# Patient Record
Sex: FEMALE | Race: WHITE | Employment: OTHER | URBAN - METROPOLITAN AREA
[De-identification: names, ages, dates, MRNs, and addresses within clinical notes are randomized per-mention and may not be internally consistent; named-entity substitution may affect disease eponyms.]

---

## 2019-02-11 ENCOUNTER — TRANSCRIBE ORDERS (OUTPATIENT)
Dept: ADMINISTRATIVE | Facility: HOSPITAL | Age: 74
End: 2019-02-11

## 2019-02-11 DIAGNOSIS — M79.661 PAIN OF RIGHT LOWER LEG: Primary | ICD-10-CM

## 2019-03-04 ENCOUNTER — CONSULT (OUTPATIENT)
Dept: HEMATOLOGY ONCOLOGY | Facility: MEDICAL CENTER | Age: 74
End: 2019-03-04
Payer: MEDICARE

## 2019-03-04 VITALS
WEIGHT: 196 LBS | DIASTOLIC BLOOD PRESSURE: 76 MMHG | SYSTOLIC BLOOD PRESSURE: 130 MMHG | TEMPERATURE: 97.6 F | HEART RATE: 62 BPM | BODY MASS INDEX: 33.46 KG/M2 | OXYGEN SATURATION: 95 % | RESPIRATION RATE: 18 BRPM | HEIGHT: 64 IN

## 2019-03-04 DIAGNOSIS — I82.4Z1 ACUTE DEEP VEIN THROMBOSIS (DVT) OF DISTAL VEIN OF RIGHT LOWER EXTREMITY (HCC): Primary | ICD-10-CM

## 2019-03-04 PROBLEM — I82.409 ACUTE DVT (DEEP VENOUS THROMBOSIS) (HCC): Status: ACTIVE | Noted: 2019-03-04

## 2019-03-04 PROCEDURE — 99204 OFFICE O/P NEW MOD 45 MIN: CPT | Performed by: INTERNAL MEDICINE

## 2019-03-04 RX ORDER — DIPHENOXYLATE HYDROCHLORIDE AND ATROPINE SULFATE 2.5; .025 MG/1; MG/1
1 TABLET ORAL DAILY
COMMUNITY

## 2019-03-04 RX ORDER — OMEPRAZOLE 40 MG/1
CAPSULE, DELAYED RELEASE ORAL
COMMUNITY
Start: 2012-02-17 | End: 2019-10-18

## 2019-03-04 RX ORDER — APIXABAN 5 MG/1
TABLET, FILM COATED ORAL
COMMUNITY
Start: 2019-02-11 | End: 2019-10-18

## 2019-03-04 RX ORDER — ATENOLOL 25 MG/1
TABLET ORAL
COMMUNITY
Start: 2018-12-26 | End: 2020-11-25 | Stop reason: ALTCHOICE

## 2019-03-04 RX ORDER — LISINOPRIL AND HYDROCHLOROTHIAZIDE 20; 12.5 MG/1; MG/1
TABLET ORAL
COMMUNITY
Start: 2010-11-09

## 2019-03-04 RX ORDER — CHOLECALCIFEROL (VITAMIN D3) 125 MCG
2000 CAPSULE ORAL DAILY
COMMUNITY

## 2019-03-12 ENCOUNTER — OFFICE VISIT (OUTPATIENT)
Dept: HEMATOLOGY ONCOLOGY | Facility: MEDICAL CENTER | Age: 74
End: 2019-03-12
Payer: MEDICARE

## 2019-03-12 VITALS
DIASTOLIC BLOOD PRESSURE: 72 MMHG | RESPIRATION RATE: 18 BRPM | BODY MASS INDEX: 32.61 KG/M2 | WEIGHT: 191 LBS | OXYGEN SATURATION: 96 % | HEART RATE: 57 BPM | TEMPERATURE: 97.4 F | HEIGHT: 64 IN | SYSTOLIC BLOOD PRESSURE: 128 MMHG

## 2019-03-12 DIAGNOSIS — I82.4Y1 ACUTE DEEP VEIN THROMBOSIS (DVT) OF PROXIMAL VEIN OF RIGHT LOWER EXTREMITY (HCC): Primary | ICD-10-CM

## 2019-03-12 DIAGNOSIS — I82.4Z1 ACUTE DEEP VEIN THROMBOSIS (DVT) OF DISTAL VEIN OF RIGHT LOWER EXTREMITY (HCC): Primary | ICD-10-CM

## 2019-03-12 PROCEDURE — 99213 OFFICE O/P EST LOW 20 MIN: CPT | Performed by: INTERNAL MEDICINE

## 2019-03-12 RX ORDER — TRAMADOL HYDROCHLORIDE 50 MG/1
50 TABLET ORAL EVERY 6 HOURS PRN
Qty: 30 TABLET | Refills: 0 | Status: SHIPPED | OUTPATIENT
Start: 2019-03-12 | End: 2019-10-18

## 2019-03-12 NOTE — PROGRESS NOTES
Kerri Turner  [birth year]  [facility] [phone]    DISCUSSION/SUMMARY:    60-year-old female with a right lower extremity (soleal [distal]) DVT)  Just previous to the symptoms, patient had taken a long car ride to [place]  This was likely a provoked incident  Mrs Delmi Schilling was just seen approximately 1+ week ago  Patient has progressive right lower extremity pain  Clinically there is no signs for progression  We discussed options  Patient is to continue the Eliquis for the time being  Patient is to go for a repeat right lower extremity Doppler soon as possible (today or tomorrow at [facility])  If there is no evidence of progressive DVT, patient is to start wearing a thigh-high compression stocking  If there is evidence of progression, other anticoagulation treatments will need to be discussed  We also discussed pain control measurements  Patient states that Tylenol is +/- effective  In the past, patient states that ibuprofen has been effective  We discussed the fact that ibuprofen can affect her platelets so that this along with the Eliquis could increase her risk for bruising and bleeding  Patient can take Aleve sporadically; I have also given the patient a prescription for Ultram   Patient will call if she has progressive pain control issues, right lower extremity swelling, pulmonary issues  Patient is to return in 3 days  Patient knows to call the hematology/oncology office if there are any other questions or concerns  Carefully review your medication list and verify that the list is accurate and up-to-date  Please call the hematology/oncology office if there are medications missing from the list, medications on the list that you are not currently taking or if there is a dosage or instruction that is different from how you're taking that medication      Patient goals and areas of care: Follow-up Doppler, continue with anticoagulation  Barriers to care:  None  Patient is able to self-care   ___________________________________________________________________________________________________    Chief Complaint   Patient presents with    Follow-up     Right lower extremity DVT     History of Present Illness:    42-year-old female recently referred for evaluation of a right lower extremity DVT  Mrs Karsten Schulte was in her usual state of good health when she began to have pain in her right calf  Because the pain persisted/progressed, patient was seen by her PCP  Workup demonstrated a right lower extremity/distal DVT  Patient was started on Eliquis (on 02/11/2019)  Approximately 1 5 weeks before beginning to feel the pain, patient drove to [place]  Mrs Karsten Schulte was seen approximately 1 5 weeks ago  The plan was for patient to continue with the Eliquis and follow-up in 2 and half months with a repeat Doppler  Patient states that over the past few days, she has had worsening right lower extremity pain that has progressed up the back of her knee and into the back of her thigh  Patient states that the pain is so bad sometimes that it makes her nauseous  No obvious cords  No recent trauma  No problems with excessive bruising or bleeding  No other problems with the Eliquis  No respiratory issues  Activities are decreased because of the pain  Review of Systems   Constitutional: Negative  HENT: Negative  Eyes: Negative  Respiratory: Negative  Cardiovascular: Positive for leg swelling  Gastrointestinal: Negative  Endocrine: Negative  Genitourinary: Negative  Musculoskeletal: Negative  +right lower extremity pain   Skin: Negative  Allergic/Immunologic: Negative  Neurological: Negative  Hematological: Negative  Psychiatric/Behavioral: Negative  All other systems reviewed and are negative       Patient Active Problem List   Diagnosis    Acute DVT (deep venous thrombosis) (HCC)     Past Medical History:   Diagnosis Date    DVT (deep venous thrombosis) (HCC)     Hyperlipidemia     Hypertension     Past medical history:  As above, normal childhood illnesses and vaccinations    Ob/gyn:  Mammograms up-to-date and within normal limits, no postmenopausal bleeding    Past surgical history:  None, no blood transfusions    No past surgical history on file  Family History   Problem Relation Age of Onset    Colon cancer Father     Heart disease Maternal Grandmother     Family history:  3 children, 2 in general good health, 3rd child with significant morbid obesity with PE in the past presently on Eliquis no other known familial or genetic diseases    Social History     Socioeconomic History    Marital status: /Civil Union     Spouse name: Not on file    Number of children: Not on file    Years of education: Not on file    Highest education level: Not on file   Occupational History    Not on file   Social Needs    Financial resource strain: Not on file    Food insecurity:     Worry: Not on file     Inability: Not on file    Transportation needs:     Medical: Not on file     Non-medical: Not on file   Tobacco Use    Smoking status: Never Smoker    Smokeless tobacco: Never Used   Substance and Sexual Activity    Alcohol use:  Yes     Alcohol/week: 0 6 oz     Types: 1 Glasses of wine per week     Frequency: Monthly or less     Drinks per session: 1 or 2     Binge frequency: Never    Drug use: Never    Sexual activity: Not on file   Lifestyle    Physical activity:     Days per week: Not on file     Minutes per session: Not on file    Stress: Not on file   Relationships    Social connections:     Talks on phone: Not on file     Gets together: Not on file     Attends Sabianist service: Not on file     Active member of club or organization: Not on file     Attends meetings of clubs or organizations: Not on file     Relationship status: Not on file  Intimate partner violence:     Fear of current or ex partner: Not on file     Emotionally abused: Not on file     Physically abused: Not on file     Forced sexual activity: Not on file   Other Topics Concern    Not on file   Social History Narrative    Not on file    Social history:  No tobacco, alcohol or drug abuse, no known toxic exposure,     Current Outpatient Medications:     atenolol (TENORMIN) 25 mg tablet, , Disp: , Rfl:     Calcium Carbonate-Vit D-Min (CALCIUM 1200 PO), Take by mouth, Disp: , Rfl:     Cholecalciferol (VITAMIN D3) 2000 units TABS, Take 2,000 Units by mouth daily, Disp: , Rfl:     ELIQUIS 5 MG, , Disp: , Rfl:     lisinopril-hydrochlorothiazide (PRINZIDE,ZESTORETIC) 20-12 5 MG per tablet, Take by mouth, Disp: , Rfl:     multivitamin (THERAGRAN) TABS, Take 1 tablet by mouth daily, Disp: , Rfl:     omeprazole (PriLOSEC) 40 MG capsule, Take by mouth, Disp: , Rfl:     Allergies   Allergen Reactions    Penicillins Hives       Vitals:    03/12/19 1259   BP: 128/72   Pulse: 57   Resp: 18   Temp: (!) 97 4 °F (36 3 °C)   SpO2: 96%     Physical Exam   Constitutional: She is oriented to person, place, and time  She appears well-developed and well-nourished  Well-nourished female, no respiratory distress   HENT:   Head: Normocephalic and atraumatic  Right Ear: External ear normal    Left Ear: External ear normal    Mouth/Throat: Oropharynx is clear and moist    Eyes: Pupils are equal, round, and reactive to light  Conjunctivae and EOM are normal    Neck: Normal range of motion  Neck supple  Supple, no JVD   Cardiovascular: Normal rate, regular rhythm, normal heart sounds and intact distal pulses  Pulmonary/Chest: Effort normal and breath sounds normal    Good air entry bilaterally, clear   Abdominal: Soft  Bowel sounds are normal    Soft, nontender, +bowel sounds, no rigidity rebound, cannot palpate liver or spleen   Musculoskeletal: Normal range of motion     Neurological: She is alert and oriented to person, place, and time  She has normal reflexes  Skin: Skin is warm  Warm, moist, good color, no petechiae or ecchymoses   Psychiatric: She has a normal mood and affect  Her behavior is normal  Judgment and thought content normal    Extremities: The right lower extremity does not look any more swollen than the left, possibly slightly less swollen than before  Patient has varicose veins over the shin but no obvious cords, pulses are 1+, area is not particularly tender, good range of motion, pulses are equal bilaterally  Lymphatics:  No adenopathy in the neck, supraclavicular region, axilla and groin bilaterally    Labs    No laboratory test results available for review    Imaging    No [facility]'s imaging studies available for review    02/11/2019 [facility] bilateral lower extremity venous duplex study conclusion stated acute occlusive DVT of the soleal vein from the distal to mid calf on the right  All other right lower extremity vessels were negative for DVT and SVT  No DVT or SVT in the left lower extremity

## 2019-03-15 ENCOUNTER — OFFICE VISIT (OUTPATIENT)
Dept: HEMATOLOGY ONCOLOGY | Facility: MEDICAL CENTER | Age: 74
End: 2019-03-15
Payer: MEDICARE

## 2019-03-15 VITALS
OXYGEN SATURATION: 93 % | RESPIRATION RATE: 18 BRPM | WEIGHT: 192 LBS | BODY MASS INDEX: 32.78 KG/M2 | DIASTOLIC BLOOD PRESSURE: 80 MMHG | TEMPERATURE: 98 F | HEART RATE: 58 BPM | SYSTOLIC BLOOD PRESSURE: 132 MMHG | HEIGHT: 64 IN

## 2019-03-15 DIAGNOSIS — I82.4Z1 ACUTE DEEP VEIN THROMBOSIS (DVT) OF DISTAL VEIN OF RIGHT LOWER EXTREMITY (HCC): Primary | ICD-10-CM

## 2019-03-15 PROCEDURE — 99213 OFFICE O/P EST LOW 20 MIN: CPT | Performed by: INTERNAL MEDICINE

## 2019-03-15 NOTE — PROGRESS NOTES
Paradise Figures  5[birth year]  [facility] [phone]    DISCUSSION/SUMMARY:    14-year-old female with a right lower extremity (soleal [distal]) DVT)  Just previous to the symptoms, patient had taken a long car ride to [place]  This was likely a provoked incident  Mrs Gonsalo Tejada has been on Eliquis but was seen earlier this week because of new right lower extremity pain  A repeat Doppler did not demonstrate any evidence of progression or new thrombosis  In fact, the Doppler demonstrated resolution of the prior DVT  This is obviously good but does not explain the patient's persistent right lower extremity/calf pain  We discussed options  Mrs Gonsalo Tejada is to continue with the anticoagulation monitoring for excessive bruising/bleeding  Patient is to take for 3 months; 3 months will be completed in early May 2019  Patient already has a follow-up appointment here in mid May 2019  I have asked the patient to return to her primary care physician at [address] [place] [address] to try and help clarify the calf pain  Possibly patient will need orthopedics or rehab  Patient will continue to use the extra strength Tylenol for pain; Mrs Gonsalo Tejada was concerned about the Ultram is a potential side effects and does not wish to try this medication  Patient knows to call the hematology/oncology office if there are any other questions or concerns  Carefully review your medication list and verify that the list is accurate and up-to-date  Please call the hematology/oncology office if there are medications missing from the list, medications on the list that you are not currently taking or if there is a dosage or instruction that is different from how you're taking that medication      Patient goals and areas of care:  Continue with anticoagulation, follow-up with PCP  Barriers to care:  None  Patient is able to self-care   ___________________________________________________________________________________________________    Chief Complaint   Patient presents with    Follow-up     Right lower extremity DVT     History of Present Illness:    20-year-old female recently referred for evaluation of a right lower extremity DVT  Mrs Joss Reinoso was in her usual state of good health when she began to have pain in her right calf  Because the pain persisted/progressed, patient was seen by her PCP  Workup demonstrated a right lower extremity/distal DVT  Patient was started on Eliquis (on 02/11/2019)  Approximately 1 5 weeks before beginning to feel the pain, patient drove to [place]  Mrs Joss Reinoso was seen early this week  Patient developed acute right lower extremity pain worse than before the diagnosis of the DVT  There were no obvious cords or clinical findings but patient was sent for repeat Doppler to rule out progression  The Doppler actually demonstrated resolution of the soleal DVT  Patient continues to have the right lower extremity pain  Tylenol is somewhat effective  Patient did not want to try the Ultram   No respiratory issues  No problems with excessive bruising or bleeding  No other pain control issues  No fevers, chills or sweats  Activities are still decreased but about the same as before  Review of Systems   Constitutional: Negative  HENT: Negative  Eyes: Negative  Respiratory: Negative  Cardiovascular: Positive for leg swelling  Gastrointestinal: Negative  Endocrine: Negative  Genitourinary: Negative  Musculoskeletal: Negative  +right lower extremity pain   Skin: Negative  Allergic/Immunologic: Negative  Neurological: Negative  Hematological: Negative  Psychiatric/Behavioral: Negative  All other systems reviewed and are negative       Patient Active Problem List   Diagnosis    Acute DVT (deep venous thrombosis) ([address] )     Past Medical History: Diagnosis Date    DVT (deep venous thrombosis) (HCC)     Hyperlipidemia     Hypertension     Past medical history:  As above, normal childhood illnesses and vaccinations    Ob/gyn:  Mammograms up-to-date and within normal limits, no postmenopausal bleeding    Past surgical history:  None, no blood transfusions    No past surgical history on file  Family History   Problem Relation Age of Onset    Colon cancer Father     Heart disease Maternal Grandmother     Family history:  3 children, 2 in general good health, 3rd child with significant morbid obesity with PE in the past presently on Eliquis no other known familial or genetic diseases    Social History     Socioeconomic History    Marital status: /Civil Union     Spouse name: Not on file    Number of children: Not on file    Years of education: Not on file    Highest education level: Not on file   Occupational History    Not on file   Social Needs    Financial resource strain: Not on file    Food insecurity:     Worry: Not on file     Inability: Not on file    Transportation needs:     Medical: Not on file     Non-medical: Not on file   Tobacco Use    Smoking status: Never Smoker    Smokeless tobacco: Never Used   Substance and Sexual Activity    Alcohol use:  Yes     Alcohol/week: 0 6 oz     Types: 1 Glasses of wine per week     Frequency: Monthly or less     Drinks per session: 1 or 2     Binge frequency: Never    Drug use: Never    Sexual activity: Not on file   Lifestyle    Physical activity:     Days per week: Not on file     Minutes per session: Not on file    Stress: Not on file   Relationships    Social connections:     Talks on phone: Not on file     Gets together: Not on file     Attends Episcopalian service: Not on file     Active member of club or organization: Not on file     Attends meetings of clubs or organizations: Not on file     Relationship status: Not on file    Intimate partner violence:     Fear of current or ex partner: Not on file     Emotionally abused: Not on file     Physically abused: Not on file     Forced sexual activity: Not on file   Other Topics Concern    Not on file   Social History Narrative    Not on file    Social history:  No tobacco, alcohol or drug abuse, no known toxic exposure,     Current Outpatient Medications:     atenolol (TENORMIN) 25 mg tablet, , Disp: , Rfl:     Calcium Carbonate-Vit D-Min (CALCIUM 1200 PO), Take by mouth, Disp: , Rfl:     Cholecalciferol (VITAMIN D3) 2000 units TABS, Take 2,000 Units by mouth daily, Disp: , Rfl:     ELIQUIS 5 MG, , Disp: , Rfl:     lisinopril-hydrochlorothiazide (PRINZIDE,ZESTORETIC) 20-12 5 MG per tablet, Take by mouth, Disp: , Rfl:     multivitamin (THERAGRAN) TABS, Take 1 tablet by mouth daily, Disp: , Rfl:     omeprazole (PriLOSEC) 40 MG capsule, Take by mouth, Disp: , Rfl:     traMADol (ULTRAM) 50 mg tablet, Take 1 tablet (50 mg total) by mouth every 6 (six) hours as needed for moderate pain, Disp: 30 tablet, Rfl: 0    Allergies   Allergen Reactions    Penicillins Hives       Vitals:    03/15/19 1002   BP: 132/80   Pulse: 58   Resp: 18   Temp: 98 °F (36 7 °C)   SpO2: 93%     Physical Exam   Constitutional: She is oriented to person, place, and time  She appears well-developed and well-nourished  Well-nourished female, no respiratory distress   HENT:   Head: Normocephalic and atraumatic  Right Ear: External ear normal    Left Ear: External ear normal    Mouth/Throat: Oropharynx is clear and moist    Eyes: Pupils are equal, round, and reactive to light  Conjunctivae and EOM are normal    Neck: Normal range of motion  Neck supple  Supple, no JVD   Cardiovascular: Normal rate, regular rhythm, normal heart sounds and intact distal pulses  Pulmonary/Chest: Effort normal and breath sounds normal    Good air entry bilaterally, clear   Abdominal: Soft   Bowel sounds are normal    Soft, nontender, +bowel sounds, no rigidity rebound, cannot palpate liver or spleen   Musculoskeletal: Normal range of motion  Neurological: She is alert and oriented to person, place, and time  She has normal reflexes  Skin: Skin is warm  Warm, moist, good color, no petechiae or ecchymoses   Psychiatric: She has a normal mood and affect  Her behavior is normal  Judgment and thought content normal    Extremities: The right lower extremity is minimally swollen, about the same as before, no cords, pulses are 1+, no redness, no signs of infection, calf is minimally tender, no more than before  Lymphatics:  No adenopathy in the neck, supraclavicular region, axilla and groin bilaterally    Labs    No laboratory test results available for review    Imaging    03/13/2019 right lower extremity Doppler from [address] [place] [address] conclusion stated that compared to the study from February 11, 2019, the soleal vein DVT had resolved and there was no evidence of DVT in any other right lower extremity superficial or deep vein    02/11/2019 ([address] [place] [address] Street) bilateral lower extremity venous duplex study conclusion stated acute occlusive DVT of the soleal vein from the distal to mid calf on the right  All other right lower extremity vessels were negative for DVT and SVT  No DVT or SVT in the left lower extremity

## 2019-05-16 ENCOUNTER — OFFICE VISIT (OUTPATIENT)
Dept: HEMATOLOGY ONCOLOGY | Facility: MEDICAL CENTER | Age: 74
End: 2019-05-16
Payer: MEDICARE

## 2019-05-16 VITALS
SYSTOLIC BLOOD PRESSURE: 128 MMHG | TEMPERATURE: 97.5 F | OXYGEN SATURATION: 97 % | WEIGHT: 193 LBS | HEART RATE: 53 BPM | RESPIRATION RATE: 18 BRPM | BODY MASS INDEX: 32.95 KG/M2 | HEIGHT: 64 IN | DIASTOLIC BLOOD PRESSURE: 70 MMHG

## 2019-05-16 DIAGNOSIS — I82.4Z1 ACUTE DEEP VEIN THROMBOSIS (DVT) OF DISTAL VEIN OF RIGHT LOWER EXTREMITY (HCC): Primary | ICD-10-CM

## 2019-05-16 PROCEDURE — 99213 OFFICE O/P EST LOW 20 MIN: CPT | Performed by: INTERNAL MEDICINE

## 2019-05-16 RX ORDER — SIMVASTATIN 10 MG
TABLET ORAL
Refills: 3 | COMMUNITY
Start: 2019-04-13 | End: 2022-06-06 | Stop reason: DRUGHIGH

## 2019-10-18 ENCOUNTER — HOSPITAL ENCOUNTER (EMERGENCY)
Facility: HOSPITAL | Age: 74
Discharge: HOME | End: 2019-10-18
Attending: EMERGENCY MEDICINE | Admitting: EMERGENCY MEDICINE
Payer: MEDICARE

## 2019-10-18 ENCOUNTER — TELEPHONE (OUTPATIENT)
Dept: HEMATOLOGY ONCOLOGY | Facility: MEDICAL CENTER | Age: 74
End: 2019-10-18

## 2019-10-18 ENCOUNTER — APPOINTMENT (EMERGENCY)
Dept: RADIOLOGY | Facility: HOSPITAL | Age: 74
End: 2019-10-18
Attending: EMERGENCY MEDICINE
Payer: MEDICARE

## 2019-10-18 VITALS
WEIGHT: 192.9 LBS | DIASTOLIC BLOOD PRESSURE: 77 MMHG | TEMPERATURE: 97.7 F | OXYGEN SATURATION: 99 % | BODY MASS INDEX: 32.93 KG/M2 | RESPIRATION RATE: 18 BRPM | HEIGHT: 64 IN | SYSTOLIC BLOOD PRESSURE: 156 MMHG | HEART RATE: 59 BPM

## 2019-10-18 DIAGNOSIS — M79.605 LEFT LEG PAIN: Primary | ICD-10-CM

## 2019-10-18 PROCEDURE — 99283 EMERGENCY DEPT VISIT LOW MDM: CPT

## 2019-10-18 PROCEDURE — 93971 EXTREMITY STUDY: CPT

## 2019-10-18 NOTE — ED PROVIDER NOTES
History  Chief Complaint   Patient presents with    Leg Pain     Patient states that she was laying on the bed a few days ago and felt like she had a shooting pain in her left leg  States that they told her to come in due to a hx of a DVT  Patient has a history of a DVT in the right lower extremity which was treated with 3 months of anticoagulation  She has been maintained on aspirin since without problem  Patient states she was laying on the couch a few days ago when she felt a sudden sharp pain in the other (left) lower extremity in the area of the medial thigh  She states the pain was sharp and radiated from the thigh toward the lower leg  Pain comes and goes  It is not associated with exertion  She is not associated with swelling  Patient became concerned and called her PCP who referred her to the ER for a DVT study  Prior to Admission Medications   Prescriptions Last Dose Informant Patient Reported? Taking? Calcium Carbonate-Vit D-Min (CALCIUM 1200 PO) 10/18/2019 at Unknown time Self Yes Yes   Sig: Take by mouth   Cholecalciferol (VITAMIN D3) 2000 units TABS 10/18/2019 at Unknown time Self Yes Yes   Sig: Take 2,000 Units by mouth daily   atenolol (TENORMIN) 25 mg tablet 10/18/2019 at Unknown time Self Yes Yes   lisinopril-hydrochlorothiazide (PRINZIDE,ZESTORETIC) 20-12 5 MG per tablet 10/18/2019 at Unknown time Self Yes Yes   Sig: Take by mouth   multivitamin (THERAGRAN) TABS 10/18/2019 at Unknown time Self Yes Yes   Sig: Take 1 tablet by mouth daily   simvastatin (ZOCOR) 10 mg tablet 10/18/2019 at Unknown time Self Yes Yes      Facility-Administered Medications: None       Past Medical History:   Diagnosis Date    DVT (deep venous thrombosis) (HCC)     Hyperlipidemia     Hypertension        History reviewed  No pertinent surgical history      Family History   Problem Relation Age of Onset    Colon cancer Father     Heart disease Maternal Grandmother      I have reviewed and agree with the history as documented  Social History     Tobacco Use    Smoking status: Never Smoker    Smokeless tobacco: Never Used   Substance Use Topics    Alcohol use: Yes     Alcohol/week: 1 0 standard drinks     Types: 1 Glasses of wine per week     Frequency: Monthly or less     Drinks per session: 1 or 2     Binge frequency: Never    Drug use: Never        Review of Systems   Constitutional: Negative for chills and fever  HENT: Negative for congestion and sore throat  Eyes: Negative for visual disturbance  Respiratory: Negative for chest tightness and shortness of breath  Cardiovascular: Negative for chest pain, palpitations and leg swelling  Gastrointestinal: Negative for abdominal pain and vomiting  Genitourinary: Negative for dysuria  Musculoskeletal: Positive for myalgias  Negative for gait problem and joint swelling  Sharp thigh pain   Skin: Negative for color change and rash  Neurological: Negative for weakness and numbness  Hematological: Does not bruise/bleed easily  Psychiatric/Behavioral: Negative for confusion  All other systems reviewed and are negative  Physical Exam  Physical Exam   Constitutional: She is oriented to person, place, and time  She appears well-developed and well-nourished  HENT:   Head: Normocephalic and atraumatic  Mouth/Throat: Oropharynx is clear and moist    Eyes: Conjunctivae are normal    Neck: Normal range of motion  Neck supple  Cardiovascular: Normal rate, regular rhythm, normal heart sounds and intact distal pulses  Pulmonary/Chest: Effort normal and breath sounds normal    Abdominal: Soft  Bowel sounds are normal  There is no tenderness  Musculoskeletal: Normal range of motion  She exhibits no edema or tenderness  No reproducible tenderness  Negative Homans  Neurological: She is alert and oriented to person, place, and time  Skin: Skin is warm and dry  Capillary refill takes less than 2 seconds     Psychiatric: She has a normal mood and affect  Her behavior is normal    Nursing note and vitals reviewed  Vital Signs  ED Triage Vitals [10/18/19 1144]   Temperature Pulse Respirations Blood Pressure SpO2   97 7 °F (36 5 °C) 59 18 156/77 99 %      Temp src Heart Rate Source Patient Position - Orthostatic VS BP Location FiO2 (%)   -- -- -- -- --      Pain Score       No Pain           Vitals:    10/18/19 1144   BP: 156/77   Pulse: 59         Visual Acuity      ED Medications  Medications - No data to display    Diagnostic Studies  Results Reviewed     None                 VAS lower limb venous duplex study, unilateral/limited    (Results Pending)              Procedures  Procedures       ED Course                               MDM  Number of Diagnoses or Management Options  Left leg pain:   Diagnosis management comments: Patient was directed to the ED by a physician for a study  My index of suspicion is low      Disposition  Final diagnoses:   Left leg pain     Time reflects when diagnosis was documented in both MDM as applicable and the Disposition within this note     Time User Action Codes Description Comment    10/18/2019  1:48 PM Elan Ugalde Add [[id number]] Left leg pain       ED Disposition     ED Disposition Condition Date/Time Comment    Discharge Stable Fri Oct 18, 2019  1:48 PM Dillon Bond discharge to home/self care  Follow-up Information     Follow up With Specialties Details Why Marcial Gonzalez III, MD Internal Medicine Schedule an appointment as soon as possible for a visit  As needed [address]  [phone]            Patient's Medications   Discharge Prescriptions    No medications on file     No discharge procedures on file      ED Provider  Electronically Signed by           Janell Jeans, MD  10/18/19 3138

## 2019-10-18 NOTE — TELEPHONE ENCOUNTER
Patient can report to the ED as imaging would be needed if she is concerned about a DVT  Otherwise, she can contact her PCP  Thanks

## 2019-10-19 PROCEDURE — 93971 EXTREMITY STUDY: CPT | Performed by: SURGERY

## 2019-11-19 NOTE — PROGRESS NOTES
Adan Hussein  [birth year]  [facility] [place] [facility] [place] [facility] [id number]  HEMATOLOGY/ONCOLOGY CONSULTATION REPORT    DISCUSSION/SUMMARY:    15-year-old female with a right lower extremity (soleal [distal]) DVT  Just previous to the symptoms, patient had taken a long car ride to [place]  This was likely a provoked incident  Presently Mrs Azalea Fleischer states feeling well and clinically there are no troubling signs including no significant lower extremity swelling or any pulmonary issues  We discussed options  We discussed what patient needs to monitor for in regard to cords, worsening pain, leg swelling or any acute pulmonary issues  We also discussed what to monitor for in regard to excessive bruising/bleeding  Previously patient had been on aspirin (Dr Ginny Banegas, Cardiology) - this is on hold  The plan is for patient to be treated for 3 months  Mrs Azalea Fleischer is to return in approximately 2 5 months with a repeat right lower extremity Doppler (at [facility]) before  As above, this was likely a provoked incident and does not require a thrombophilia evaluation  I have asked the patient to speak with her son regarding his PE; see if son underwent a thrombophilia evaluation  The below comes from [facility]; 2016; 149 (2):  [phone]            We will wait and see how patient does with anticoagulation and the right lower extremity DVT but it may be prudent for patient to take Eliquis the day before, the day of and the day after long car rides in the future (patient and  drive to [place] routinely)  Patient is to return in approximately 2 5 months  Patient knows to call the hematology/oncology office if there are any other questions or concerns  Carefully review your medication list and verify that the list is accurate and up-to-date   Please call the hematology/oncology office if there are medications missing from the list, medications on the list that you are not currently taking or if there is a dosage or instruction that is different from how you're taking that medication  Patient goals and areas of care:  Continue with anticoagulation  Barriers to care:  None  Patient is able to self-care   ___________________________________________________________________________________________________    Chief Complaint   Patient presents with    Consult     Right lower extremity DVT     History of Present Illness:    80-year-old female referred for evaluation of a right lower extremity DVT  Mrs Ana Maria Del Rosario was in her usual state of good health when she began to have pain in her right calf  Because the pain persisted/progressed, patient was seen by her PCP  Workup demonstrated a right lower extremity/distal DVT  Patient was started on Eliquis (on 02/11/2019)  Approximately 1 5 weeks before beginning to feel the pain, patient drove to [place]  Presently Mrs Ana Maria Del Rosario states feeling okay, close to baseline  Right lower extremity pain is gone  Activities are close to normal   No significant right lower extremity swelling (less/better than before)  No problems with excessive bruising or bleeding  No shortness of breath or dyspnea on exertion, no chest pain or pressure  No sputum, cough or hemoptysis  No headaches, blurred vision or dizziness, no syncopal episodes  Review of Systems   Constitutional: Negative  HENT: Negative  Eyes: Negative  Respiratory: Negative  Cardiovascular: Positive for leg swelling  Gastrointestinal: Negative  Endocrine: Negative  Genitourinary: Negative  Musculoskeletal: Negative  Skin: Negative  Allergic/Immunologic: Negative  Neurological: Negative  Hematological: Negative  Psychiatric/Behavioral: Negative  All other systems reviewed and are negative       Patient Active Problem List   Diagnosis    Acute DVT (deep venous thrombosis) [facility])     Past Medical History:   Diagnosis Date    DVT (deep venous thrombosis) (HCC)     Hyperlipidemia     Hypertension     Past medical history:  As above, normal childhood illnesses and vaccinations    Ob/gyn:  Mammograms up-to-date and within normal limits, no postmenopausal bleeding    Past surgical history:  None, no blood transfusions    History reviewed  No pertinent surgical history  Family History   Problem Relation Age of Onset    Colon cancer Father     Heart disease Maternal Grandmother     Family history:  3 children, 2 in general good health, 3rd child with significant morbid obesity with PE in the past presently on Eliquis no other known familial or genetic diseases    Social History     Socioeconomic History    Marital status: /Civil Union     Spouse name: Not on file    Number of children: Not on file    Years of education: Not on file    Highest education level: Not on file   Occupational History    Not on file   Social Needs    Financial resource strain: Not on file    Food insecurity:     Worry: Not on file     Inability: Not on file    Transportation needs:     Medical: Not on file     Non-medical: Not on file   Tobacco Use    Smoking status: Never Smoker    Smokeless tobacco: Never Used   Substance and Sexual Activity    Alcohol use:  Yes     Alcohol/week: 0 6 oz     Types: 1 Glasses of wine per week     Frequency: Monthly or less     Drinks per session: 1 or 2     Binge frequency: Never    Drug use: Never    Sexual activity: Not on file   Lifestyle    Physical activity:     Days per week: Not on file     Minutes per session: Not on file    Stress: Not on file   Relationships    Social connections:     Talks on phone: Not on file     Gets together: Not on file     Attends Yazidism service: Not on file     Active member of club or organization: Not on file     Attends meetings of clubs or organizations: Not on file     Relationship status: Not on file    Intimate partner violence: Fear of current or ex partner: Not on file     Emotionally abused: Not on file     Physically abused: Not on file     Forced sexual activity: Not on file   Other Topics Concern    Not on file   Social History Narrative    Not on file    Social history:  No tobacco, alcohol or drug abuse, no known toxic exposure,     Current Outpatient Medications:     atenolol (TENORMIN) 25 mg tablet, , Disp: , Rfl:     Calcium Carbonate-Vit D-Min (CALCIUM 1200 PO), Take by mouth, Disp: , Rfl:     Cholecalciferol (VITAMIN D3) 2000 units TABS, Take 2,000 Units by mouth daily, Disp: , Rfl:     ELIQUIS 5 MG, , Disp: , Rfl:     lisinopril-hydrochlorothiazide (PRINZIDE,ZESTORETIC) 20-12 5 MG per tablet, Take by mouth, Disp: , Rfl:     multivitamin (THERAGRAN) TABS, Take 1 tablet by mouth daily, Disp: , Rfl:     omeprazole (PriLOSEC) 40 MG capsule, Take by mouth, Disp: , Rfl:     Allergies   Allergen Reactions    Penicillins Hives       Vitals:    03/04/19 1345   BP: 130/76   Pulse: 62   Resp: 18   Temp: 97 6 °F (36 4 °C)   SpO2: 95%     Physical Exam   Constitutional: She is oriented to person, place, and time  She appears well-developed and well-nourished  Well-nourished female, no respiratory distress   HENT:   Head: Normocephalic and atraumatic  Right Ear: External ear normal    Left Ear: External ear normal    Mouth/Throat: Oropharynx is clear and moist    Eyes: Pupils are equal, round, and reactive to light  Conjunctivae and EOM are normal    Neck: Normal range of motion  Neck supple  Supple, no JVD   Cardiovascular: Normal rate, regular rhythm, normal heart sounds and intact distal pulses  Pulmonary/Chest: Effort normal and breath sounds normal    Good air entry bilaterally, clear   Abdominal: Soft  Bowel sounds are normal    Soft, nontender, +bowel sounds, no rigidity rebound, cannot palpate liver or spleen   Musculoskeletal: Normal range of motion     Neurological: She is alert and oriented to person, place, and time  She has normal reflexes  Skin: Skin is warm  Warm, moist, good color, no petechiae or ecchymoses   Psychiatric: She has a normal mood and affect  Her behavior is normal  Judgment and thought content normal    Extremities:  0-1 +right lower extremity edema, no cords, +varicose veins, pulses are 1+, no left lower extremity edema  Lymphatics:  No adenopathy in the neck, supraclavicular region, axilla and groin bilaterally    Labs    No laboratory test results available for review    Imaging    No [facility] imaging studies available for review    02/11/2019 [facility]) bilateral lower extremity venous duplex study conclusion stated acute occlusive DVT of the soleal vein from the distal to mid calf on the right  All other right lower extremity vessels were negative for DVT and SVT  No DVT or SVT in the left lower extremity  19-Nov-2019 17:21

## 2020-04-09 ENCOUNTER — TELEPHONE (OUTPATIENT)
Dept: HEMATOLOGY ONCOLOGY | Facility: CLINIC | Age: 75
End: 2020-04-09

## 2020-04-30 ENCOUNTER — OFFICE VISIT (OUTPATIENT)
Dept: HEMATOLOGY ONCOLOGY | Facility: MEDICAL CENTER | Age: 75
End: 2020-04-30
Payer: MEDICARE

## 2020-04-30 VITALS
SYSTOLIC BLOOD PRESSURE: 138 MMHG | HEIGHT: 64 IN | RESPIRATION RATE: 16 BRPM | HEART RATE: 62 BPM | BODY MASS INDEX: 31.41 KG/M2 | TEMPERATURE: 96.7 F | OXYGEN SATURATION: 98 % | DIASTOLIC BLOOD PRESSURE: 84 MMHG | WEIGHT: 184 LBS

## 2020-04-30 DIAGNOSIS — I82.4Z1 ACUTE DEEP VEIN THROMBOSIS (DVT) OF DISTAL VEIN OF RIGHT LOWER EXTREMITY (HCC): Primary | ICD-10-CM

## 2020-04-30 PROCEDURE — 99214 OFFICE O/P EST MOD 30 MIN: CPT | Performed by: INTERNAL MEDICINE

## 2020-05-01 ENCOUNTER — APPOINTMENT (OUTPATIENT)
Dept: LAB | Facility: CLINIC | Age: 75
End: 2020-05-01
Payer: MEDICARE

## 2020-05-01 DIAGNOSIS — I82.4Z1 ACUTE DEEP VEIN THROMBOSIS (DVT) OF DISTAL VEIN OF RIGHT LOWER EXTREMITY (HCC): ICD-10-CM

## 2020-05-01 LAB
ALBUMIN SERPL BCP-MCNC: 3.5 G/DL (ref 3.5–5)
ALP SERPL-CCNC: 74 U/L (ref 46–116)
ALT SERPL W P-5'-P-CCNC: 26 U/L (ref 12–78)
ANION GAP SERPL CALCULATED.3IONS-SCNC: 5 MMOL/L (ref 4–13)
AST SERPL W P-5'-P-CCNC: 11 U/L (ref 5–45)
BASOPHILS # BLD AUTO: 0.07 THOUSANDS/ΜL (ref 0–0.1)
BASOPHILS NFR BLD AUTO: 1 % (ref 0–1)
BILIRUB SERPL-MCNC: 0.6 MG/DL (ref 0.2–1)
BUN SERPL-MCNC: 20 MG/DL (ref 5–25)
CALCIUM SERPL-MCNC: 9.3 MG/DL (ref 8.3–10.1)
CHLORIDE SERPL-SCNC: 107 MMOL/L (ref 100–108)
CO2 SERPL-SCNC: 30 MMOL/L (ref 21–32)
CREAT SERPL-MCNC: 0.91 MG/DL (ref 0.6–1.3)
EOSINOPHIL # BLD AUTO: 0.18 THOUSAND/ΜL (ref 0–0.61)
EOSINOPHIL NFR BLD AUTO: 4 % (ref 0–6)
ERYTHROCYTE [DISTWIDTH] IN BLOOD BY AUTOMATED COUNT: 12.6 % (ref 11.6–15.1)
GFR SERPL CREATININE-BSD FRML MDRD: 62 ML/MIN/1.73SQ M
GLUCOSE P FAST SERPL-MCNC: 94 MG/DL (ref 65–99)
HCT VFR BLD AUTO: 37 % (ref 34.8–46.1)
HGB BLD-MCNC: 12 G/DL (ref 11.5–15.4)
IMM GRANULOCYTES # BLD AUTO: 0.01 THOUSAND/UL (ref 0–0.2)
IMM GRANULOCYTES NFR BLD AUTO: 0 % (ref 0–2)
LYMPHOCYTES # BLD AUTO: 2.06 THOUSANDS/ΜL (ref 0.6–4.47)
LYMPHOCYTES NFR BLD AUTO: 42 % (ref 14–44)
MCH RBC QN AUTO: 29 PG (ref 26.8–34.3)
MCHC RBC AUTO-ENTMCNC: 32.4 G/DL (ref 31.4–37.4)
MCV RBC AUTO: 89 FL (ref 82–98)
MONOCYTES # BLD AUTO: 0.44 THOUSAND/ΜL (ref 0.17–1.22)
MONOCYTES NFR BLD AUTO: 9 % (ref 4–12)
NEUTROPHILS # BLD AUTO: 2.11 THOUSANDS/ΜL (ref 1.85–7.62)
NEUTS SEG NFR BLD AUTO: 44 % (ref 43–75)
NRBC BLD AUTO-RTO: 0 /100 WBCS
PLATELET # BLD AUTO: 220 THOUSANDS/UL (ref 149–390)
PMV BLD AUTO: 10.7 FL (ref 8.9–12.7)
POTASSIUM SERPL-SCNC: 4.2 MMOL/L (ref 3.5–5.3)
PROT SERPL-MCNC: 7.1 G/DL (ref 6.4–8.2)
RBC # BLD AUTO: 4.14 MILLION/UL (ref 3.81–5.12)
SODIUM SERPL-SCNC: 142 MMOL/L (ref 136–145)
WBC # BLD AUTO: 4.87 THOUSAND/UL (ref 4.31–10.16)

## 2020-05-01 PROCEDURE — 36415 COLL VENOUS BLD VENIPUNCTURE: CPT

## 2020-05-01 PROCEDURE — 85025 COMPLETE CBC W/AUTO DIFF WBC: CPT

## 2020-05-01 PROCEDURE — 80053 COMPREHEN METABOLIC PANEL: CPT

## 2020-06-12 ENCOUNTER — DOCUMENTATION (OUTPATIENT)
Dept: HEMATOLOGY ONCOLOGY | Facility: MEDICAL CENTER | Age: 75
End: 2020-06-12

## 2020-06-15 ENCOUNTER — OFFICE VISIT (OUTPATIENT)
Dept: HEMATOLOGY ONCOLOGY | Facility: MEDICAL CENTER | Age: 75
End: 2020-06-15
Payer: MEDICARE

## 2020-06-15 VITALS
HEART RATE: 52 BPM | DIASTOLIC BLOOD PRESSURE: 72 MMHG | SYSTOLIC BLOOD PRESSURE: 142 MMHG | BODY MASS INDEX: 31.58 KG/M2 | HEIGHT: 64 IN | TEMPERATURE: 97 F | RESPIRATION RATE: 18 BRPM | WEIGHT: 185 LBS | OXYGEN SATURATION: 99 %

## 2020-06-15 DIAGNOSIS — I82.4Z1 ACUTE DEEP VEIN THROMBOSIS (DVT) OF DISTAL VEIN OF RIGHT LOWER EXTREMITY (HCC): Primary | ICD-10-CM

## 2020-06-15 PROCEDURE — 99213 OFFICE O/P EST LOW 20 MIN: CPT | Performed by: INTERNAL MEDICINE

## 2020-06-15 RX ORDER — ASPIRIN 81 MG/1
81 TABLET ORAL DAILY
COMMUNITY
End: 2022-06-06 | Stop reason: ALTCHOICE

## 2020-09-29 ENCOUNTER — TELEPHONE (OUTPATIENT)
Dept: HEMATOLOGY ONCOLOGY | Facility: CLINIC | Age: 75
End: 2020-09-29

## 2020-09-29 NOTE — TELEPHONE ENCOUNTER
Patient called for refill for Eliquis 5mg PO BID  Patient was previously getting this through PCP  Per patient Dr Jyothi Vidal said he would fill this for her in the future when she needed a refill    Patient is requesting a 90 day supply sent to the [facility] in [place]    Will forward to RN with Dr Jyothi Vidal to review he is ok with filling medication for patient

## 2020-09-30 DIAGNOSIS — I82.4Z1 ACUTE DEEP VEIN THROMBOSIS (DVT) OF DISTAL VEIN OF RIGHT LOWER EXTREMITY (HCC): Primary | ICD-10-CM

## 2020-10-01 ENCOUNTER — DOCUMENTATION (OUTPATIENT)
Dept: HEMATOLOGY ONCOLOGY | Facility: MEDICAL CENTER | Age: 75
End: 2020-10-01

## 2020-10-02 ENCOUNTER — TELEPHONE (OUTPATIENT)
Dept: HEMATOLOGY ONCOLOGY | Facility: CLINIC | Age: 75
End: 2020-10-02

## 2020-11-24 ENCOUNTER — DOCUMENTATION (OUTPATIENT)
Dept: HEMATOLOGY ONCOLOGY | Facility: MEDICAL CENTER | Age: 75
End: 2020-11-24

## 2020-11-25 ENCOUNTER — OFFICE VISIT (OUTPATIENT)
Dept: HEMATOLOGY ONCOLOGY | Facility: MEDICAL CENTER | Age: 75
End: 2020-11-25
Payer: MEDICARE

## 2020-11-25 VITALS
RESPIRATION RATE: 16 BRPM | OXYGEN SATURATION: 100 % | BODY MASS INDEX: 32.1 KG/M2 | DIASTOLIC BLOOD PRESSURE: 88 MMHG | WEIGHT: 188 LBS | HEIGHT: 64 IN | SYSTOLIC BLOOD PRESSURE: 138 MMHG | TEMPERATURE: 97.6 F | HEART RATE: 73 BPM

## 2020-11-25 DIAGNOSIS — I82.4Z1 ACUTE DEEP VEIN THROMBOSIS (DVT) OF DISTAL VEIN OF RIGHT LOWER EXTREMITY (HCC): Primary | ICD-10-CM

## 2020-11-25 PROCEDURE — 99213 OFFICE O/P EST LOW 20 MIN: CPT | Performed by: INTERNAL MEDICINE

## 2020-11-25 RX ORDER — METOPROLOL SUCCINATE 25 MG/1
25 TABLET, EXTENDED RELEASE ORAL DAILY
COMMUNITY
Start: 2020-11-23 | End: 2022-06-06 | Stop reason: ALTCHOICE

## 2021-05-26 ENCOUNTER — OFFICE VISIT (OUTPATIENT)
Dept: HEMATOLOGY ONCOLOGY | Facility: MEDICAL CENTER | Age: 76
End: 2021-05-26
Payer: MEDICARE

## 2021-05-26 VITALS
DIASTOLIC BLOOD PRESSURE: 62 MMHG | HEART RATE: 52 BPM | HEIGHT: 64 IN | RESPIRATION RATE: 18 BRPM | TEMPERATURE: 98.4 F | SYSTOLIC BLOOD PRESSURE: 120 MMHG | WEIGHT: 191 LBS | OXYGEN SATURATION: 93 % | BODY MASS INDEX: 32.61 KG/M2

## 2021-05-26 DIAGNOSIS — I82.4Z1 ACUTE DEEP VEIN THROMBOSIS (DVT) OF DISTAL VEIN OF RIGHT LOWER EXTREMITY (HCC): Primary | ICD-10-CM

## 2021-05-26 PROCEDURE — 99213 OFFICE O/P EST LOW 20 MIN: CPT | Performed by: INTERNAL MEDICINE

## 2021-05-26 NOTE — PROGRESS NOTES
Juan Lundberg  [birth year]  [facility] [place] [id number]    DISCUSSION/SUMMARY:    68year-old female previously with a right lower extremity (soleal [distal]) DVT)  Just previous to her symptoms, patient had taken a long car ride to [place]  This was likely a provoked incident  Mrs Ana Maria Del Rosario completed 3+ months of anticoagulation treatment  Unfortunately, more recently patient developed another left lower extremity DVT  No clear provoking incident at that time  Eliquis was restarted  Mrs Ana Maria Del Rosario feels well and clinically there are no concerning signs  Patient is tolerating the Eliquis well  The plan is to continue  Patient knows to call the office if she is scheduled for any invasive procedure or surgery  Patient will also monitor for any lower extremity swelling, cords, pain or any acute respiratory issues  As discussed previously, because Mrs Ana Maria Del Rosario needs anticoagulation long-term, I do not believe that a thrombophilia evaluation is needed  There is no family history of clots; patient has 1 son who is morbidly obese and had 1 DVT previously  No grandchildren  Of note, patient's  is also on Eliquis - suffered DVT and PE during his COVID infection  Patient is to return in 6 months  Patient knows to call the hematology/oncology office if there are any other questions or concerns  Carefully review your medication list and verify that the list is accurate and up-to-date  Please call the hematology/oncology office if there are medications missing from the list, medications on the list that you are not currently taking or if there is a dosage or instruction that is different from how you're taking that medication      Patient goals and areas of care:  Eliquis  Barriers to care:  None  Patient is able to self-care   ___________________________________________________________________________________________________    Chief Complaint   Patient presents with    Follow-up     Recurrent lower extremity DVTs     History of Present Illness:    15-year-old female previously referred for evaluation of a right lower extremity DVT (long car ride)  Mrs Donell Dobbs completed 3+ months of anticoagulation treatment  More recently patient noticed left lower extremity swelling and tenderness in the calf region  Patient also noticed mild difficulty getting up the steps in her house  Patient was seen by her PCP and a Doppler was ordered  Patient was found to have a new left lower extremity DVT  Mrs Donell Dobbs was restarted on Eliquis  Patient noted that the mild pulmonary symptoms went away within a few days of being on the Eliquis  There was no precipitating event for the 2nd DVT  Patient returns for follow-up  Patient was diagnosed with COVID last fall - no serious complications from the infection  Mrs Donell Dobbs states feeling well  No problems with excessive bruising or bleeding  No shortness of breath or dyspnea on exertion, no chest pain or pressure  No lower extremity swelling, cords or pain  Activities are baseline  Appetite is good, weight is stable  Routine health maintenance and medical care is up-to-date  Patient was previously infected would COVID but has subsequently completed the COVID vaccination  Review of Systems   Constitutional: Negative  HENT: Negative  Eyes: Negative  Respiratory: Negative  Cardiovascular: Negative for leg swelling  Gastrointestinal: Negative  Endocrine: Negative  Genitourinary: Negative  Musculoskeletal: Negative  Skin: Negative  Allergic/Immunologic: Negative  Neurological: Negative  Hematological: Negative  Psychiatric/Behavioral: Negative  All other systems reviewed and are negative       Patient Active Problem List   Diagnosis  Acute DVT (deep venous thrombosis) (HCC)     Past Medical History:   Diagnosis Date    DVT (deep venous thrombosis) (HCC)     Hyperlipidemia     Hypertension     Past medical history:  As above, normal childhood illnesses and vaccinations    Ob/gyn:  Mammograms up-to-date and within normal limits, no postmenopausal bleeding    Past surgical history:  None, no blood transfusions    No past surgical history on file  Family History   Problem Relation Age of Onset    Colon cancer Father     Heart disease Maternal Grandmother     Family history:  3 children, 2 in general good health, 3rd child with significant morbid obesity with PE in the past presently on Eliquis no other known familial or genetic diseases    Social History     Socioeconomic History    Marital status: /Civil Union     Spouse name: Not on file    Number of children: Not on file    Years of education: Not on file    Highest education level: Not on file   Occupational History    Not on file   Social Needs    Financial resource strain: Not on file    Food insecurity     Worry: Not on file     Inability: Not on file   [employer] needs     Medical: Not on file     Non-medical: Not on file   Tobacco Use    Smoking status: Never Smoker    Smokeless tobacco: Never Used   Substance and Sexual Activity    Alcohol use:  Yes     Alcohol/week: 1 0 standard drinks     Types: 1 Glasses of wine per week     Frequency: Monthly or less     Drinks per session: 1 or 2     Binge frequency: Never    Drug use: Never    Sexual activity: Not on file   Lifestyle    Physical activity     Days per week: Not on file     Minutes per session: Not on file    Stress: Not on file   Relationships    Social connections     Talks on phone: Not on file     Gets together: Not on file     Attends Jewish service: Not on file     Active member of club or organization: Not on file     Attends meetings of clubs or organizations: Not on file     Relationship status: Not on file    Intimate partner violence     Fear of current or ex partner: Not on file     Emotionally abused: Not on file     Physically abused: Not on file     Forced sexual activity: Not on file   Other Topics Concern    Not on file   Social History Narrative    Not on file    Social history:  No tobacco, alcohol or drug abuse, no known toxic exposure,     Current Outpatient Medications:     apixaban (Eliquis) 5 mg, Take 1 tablet (5 mg total) by mouth 2 (two) times a day, Disp: 60 tablet, Rfl: 5    Calcium Carbonate-Vit D-Min (CALCIUM 1200 PO), Take by mouth, Disp: , Rfl:     Cholecalciferol (VITAMIN D3) 2000 units TABS, Take 2,000 Units by mouth daily, Disp: , Rfl:     lisinopril-hydrochlorothiazide (PRINZIDE,ZESTORETIC) 20-12 5 MG per tablet, Take by mouth, Disp: , Rfl:     metoprolol succinate (TOPROL-XL) 25 mg 24 hr tablet, Take 25 mg by mouth daily, Disp: , Rfl:     multivitamin (THERAGRAN) TABS, Take 1 tablet by mouth daily, Disp: , Rfl:     simvastatin (ZOCOR) 10 mg tablet, , Disp: , Rfl: 3    aspirin (ASPIRIN LOW DOSE) 81 mg EC tablet, Take 81 mg by mouth daily, Disp: , Rfl:     Allergies   Allergen Reactions    Penicillins Hives       Vitals:    05/26/21 1104   BP: 120/62   Pulse: (!) 52   Resp: 18   Temp: 98 4 °F (36 9 °C)   SpO2: 93%     Physical Exam  Constitutional:       Appearance: She is well-developed  Comments: Well-nourished female, no respiratory distress   HENT:      Head: Normocephalic and atraumatic  Right Ear: External ear normal       Left Ear: External ear normal    Eyes:      Conjunctiva/sclera: Conjunctivae normal       Pupils: Pupils are equal, round, and reactive to light  Neck:      Musculoskeletal: Normal range of motion and neck supple  Comments: Supple, no JVD  Cardiovascular:      Rate and Rhythm: Normal rate and regular rhythm  Heart sounds: Normal heart sounds     Pulmonary:      Effort: Pulmonary effort is normal       Breath sounds: Normal breath sounds  Abdominal:      General: Bowel sounds are normal       Palpations: Abdomen is soft  Comments: Soft, nontender, +bowel sounds, no rigidity rebound, cannot palpate liver or spleen   Musculoskeletal: Normal range of motion  Skin:     General: Skin is warm  Comments: Warm, moist, good color, no petechiae or ecchymoses   Neurological:      Mental Status: She is alert and oriented to person, place, and time  Deep Tendon Reflexes: Reflexes are normal and symmetric  Psychiatric:         Behavior: Behavior normal          Thought Content: Thought content normal          Judgment: Judgment normal      Extremities:  No lower extremity edema bilaterally, no cords, pulses are 1+ bilaterally  Lymphatics:  No adenopathy in the neck, supraclavicular region, axilla and groin bilaterally    Labs    05/18/2021 WBC = 4 5 hemoglobin = 12 1 hematocrit = 35 MCV = 87 platelet = 403 neutrophil = 44% lymphocytes = 41% monocyte = 9% BUN = 18 creatinine = 1 01    05/01/2020 WBC = 4 8 hemoglobin = 12 hematocrit = 37 MCV = 89 platelet = 734 neutrophil = 44% BUN = 20 creatinine = 0 91 calcium = 9 3 LFTs WNL    Imaging    03/13/2019 right lower extremity Doppler from [address] [place] [address] conclusion stated that compared to the study from February 11, 2019, the soleal vein DVT had resolved and there was no evidence of DVT in any other right lower extremity superficial or deep vein    02/11/2019 ([address] [place] [address]) bilateral lower extremity venous duplex study conclusion stated acute occlusive DVT of the soleal vein from the distal to mid calf on the right  All other right lower extremity vessels were negative for DVT and SVT  No DVT or SVT in the left lower extremity

## 2021-07-06 DIAGNOSIS — I82.4Z1 ACUTE DEEP VEIN THROMBOSIS (DVT) OF DISTAL VEIN OF RIGHT LOWER EXTREMITY (HCC): ICD-10-CM

## 2021-11-24 ENCOUNTER — OFFICE VISIT (OUTPATIENT)
Dept: HEMATOLOGY ONCOLOGY | Facility: MEDICAL CENTER | Age: 76
End: 2021-11-24
Payer: MEDICARE

## 2021-11-24 VITALS
DIASTOLIC BLOOD PRESSURE: 82 MMHG | BODY MASS INDEX: 33.46 KG/M2 | OXYGEN SATURATION: 94 % | HEIGHT: 64 IN | SYSTOLIC BLOOD PRESSURE: 132 MMHG | WEIGHT: 196 LBS | RESPIRATION RATE: 16 BRPM | TEMPERATURE: 96.9 F | HEART RATE: 71 BPM

## 2021-11-24 DIAGNOSIS — I82.4Z1 ACUTE DEEP VEIN THROMBOSIS (DVT) OF DISTAL VEIN OF RIGHT LOWER EXTREMITY (HCC): Primary | ICD-10-CM

## 2021-11-24 PROCEDURE — 99213 OFFICE O/P EST LOW 20 MIN: CPT | Performed by: INTERNAL MEDICINE

## 2022-02-09 DIAGNOSIS — I82.4Z1 ACUTE DEEP VEIN THROMBOSIS (DVT) OF DISTAL VEIN OF RIGHT LOWER EXTREMITY (HCC): ICD-10-CM

## 2022-02-10 RX ORDER — APIXABAN 5 MG/1
TABLET, FILM COATED ORAL
Qty: 60 TABLET | Refills: 0 | Status: SHIPPED | OUTPATIENT
Start: 2022-02-10 | End: 2022-03-14

## 2022-03-11 DIAGNOSIS — I82.4Z1 ACUTE DEEP VEIN THROMBOSIS (DVT) OF DISTAL VEIN OF RIGHT LOWER EXTREMITY (HCC): ICD-10-CM

## 2022-03-14 RX ORDER — APIXABAN 5 MG/1
TABLET, FILM COATED ORAL
Qty: 60 TABLET | Refills: 0 | Status: SHIPPED | OUTPATIENT
Start: 2022-03-14 | End: 2022-04-14 | Stop reason: SDUPTHER

## 2022-04-14 ENCOUNTER — TELEPHONE (OUTPATIENT)
Dept: HEMATOLOGY ONCOLOGY | Facility: CLINIC | Age: 77
End: 2022-04-14

## 2022-04-14 DIAGNOSIS — I82.4Z1 ACUTE DEEP VEIN THROMBOSIS (DVT) OF DISTAL VEIN OF RIGHT LOWER EXTREMITY (HCC): ICD-10-CM

## 2022-04-14 NOTE — TELEPHONE ENCOUNTER
Medication Refill     Who is Calling  Patient   Medication Eliquis 5 MG [[id number]   How many pills left  2   Preferred Pharmacy / [address]    Who is your Physician?  Dr Leisa Taylor   Call back number [phone]   Relevant Information

## 2022-05-19 ENCOUNTER — DOCUMENTATION (OUTPATIENT)
Dept: HEMATOLOGY ONCOLOGY | Facility: MEDICAL CENTER | Age: 77
End: 2022-05-19

## 2022-05-19 ENCOUNTER — TELEPHONE (OUTPATIENT)
Dept: HEMATOLOGY ONCOLOGY | Facility: CLINIC | Age: 77
End: 2022-05-19

## 2022-05-19 NOTE — TELEPHONE ENCOUNTER
Appointment Cancellation Or Reschedule     Person calling in patient   Provider Minnie Boone   Office Visit Date and Time 5/23 @ 8   Office Visit Location [facility]   Did patient want to reschedule their office appointment? If so, when was it scheduled to? Yes 6/8 @ 120 with Dr Paris Arce   Is this patient calling to reschedule an infusion appointment? no   When is their next infusion appointment? n/a   Is this patient a Chemo patient? no   Reason for Cancellation or Reschedule Office changed appt to 5/23 but that date does not work for patient     If the patient is a treatment patient, please route this to the office nurse  If the patient is not on treatment, please route to the office MA

## 2022-05-19 NOTE — PROGRESS NOTES
Left voicemail for patient informing her that her appointment on Fri 5/27/22 at 9:20am with Dr Didi Pettit has been rescheduled to [facility]  5/23/22 at 8am with Jen Lopez  I asked patient to call back if this new date/time does not work for her

## 2022-06-06 ENCOUNTER — OFFICE VISIT (OUTPATIENT)
Dept: URGENT CARE | Facility: CLINIC | Age: 77
End: 2022-06-06
Payer: MEDICARE

## 2022-06-06 VITALS — OXYGEN SATURATION: 100 % | TEMPERATURE: 98.9 F | HEART RATE: 64 BPM | RESPIRATION RATE: 14 BRPM

## 2022-06-06 DIAGNOSIS — L02.31 ABSCESS OF RIGHT BUTTOCK: Primary | ICD-10-CM

## 2022-06-06 PROCEDURE — 99203 OFFICE O/P NEW LOW 30 MIN: CPT | Performed by: PHYSICIAN ASSISTANT

## 2022-06-06 RX ORDER — METOPROLOL SUCCINATE 50 MG/1
50 TABLET, EXTENDED RELEASE ORAL DAILY
COMMUNITY
Start: 2022-03-28

## 2022-06-06 RX ORDER — SIMVASTATIN 20 MG
20 TABLET ORAL EVERY EVENING
COMMUNITY
Start: 2022-03-28

## 2022-06-06 RX ORDER — DOXYCYCLINE 100 MG/1
100 CAPSULE ORAL 2 TIMES DAILY
Qty: 14 CAPSULE | Refills: 0 | Status: SHIPPED | OUTPATIENT
Start: 2022-06-06 | End: 2022-06-13

## 2022-06-06 NOTE — PROGRESS NOTES
[facility]        NAME: Juan Julien is a 68 y o  female  : 1945    MRN: [id number]  DATE: 2022  TIME: 1:34 PM    Assessment and Plan   Abscess of right buttock [L02 31]  1  Abscess of right buttock  doxycycline monohydrate (MONODOX) 100 mg capsule     Recommend sitz baths, warm soaks, close follow up with pcp  Discussed strict return to care precautions as well as red flag symptoms which should prompt immediate ED referral  Pt verbalized understanding and is in agreement with plan  Please follow up with your primary care provider within the next week  Please remember that your visit today was with an urgent care provider and should not replace follow up with your primary care provider for chronic medical issues or annual physicals  Patient Instructions       Follow up with PCP in 3-5 days  Proceed to  ER if symptoms worsen  Chief Complaint     Chief Complaint   Patient presents with    Mass     Pt presents with lumps on the buttocks area  first noted last night         History of Present Illness       Patient presents with bump on medial right buttocks noticed yesterday in the shower  This morning became slightly tender  No drainage, weakness, fevers body aches  States that about 50 years ago she thinks she had something "popped" in buttocks area but cannot recall completely and has not had anything similar since  Allergic to PCNs and is on Eliquis  Review of Systems   Review of Systems   Constitutional: Negative for chills, diaphoresis and fever  HENT: Negative for congestion, rhinorrhea and sore throat  Eyes: Negative for discharge and itching  Respiratory: Negative for cough, chest tightness, shortness of breath and wheezing  Cardiovascular: Negative for chest pain  Gastrointestinal: Negative for diarrhea, nausea and vomiting  Musculoskeletal: Negative for myalgias  Skin: Negative for rash  Neurological: Negative for weakness and numbness  Current Medications       Current Outpatient Medications:     apixaban (Eliquis) 5 mg, Take 1 tablet (5 mg total) by mouth 2 (two) times a day, Disp: 60 tablet, Rfl: 5    Calcium Carbonate-Vit D-Min (CALCIUM 1200 PO), Take by mouth, Disp: , Rfl:     Cholecalciferol (VITAMIN D3) 2000 units TABS, Take 2,000 Units by mouth daily, Disp: , Rfl:     doxycycline monohydrate (MONODOX) 100 mg capsule, Take 1 capsule (100 mg total) by mouth 2 (two) times a day for 7 days, Disp: 14 capsule, Rfl: 0    lisinopril-hydrochlorothiazide (PRINZIDE,ZESTORETIC) 20-12 5 MG per tablet, Take by mouth, Disp: , Rfl:     metoprolol succinate (TOPROL-XL) 50 mg 24 hr tablet, Take 50 mg by mouth daily, Disp: , Rfl:     multivitamin (THERAGRAN) TABS, Take 1 tablet by mouth daily, Disp: , Rfl:     simvastatin (ZOCOR) 20 mg tablet, Take 20 mg by mouth every evening, Disp: , Rfl:     Current Allergies     Allergies as of 06/06/2022 - Reviewed 06/06/2022   Allergen Reaction Noted    Penicillins Hives 03/04/2019            The following portions of the patient's history were reviewed and updated as appropriate: allergies, current medications, past family history, past medical history, past social history, past surgical history and problem list      Past Medical History:   Diagnosis Date    DVT (deep venous thrombosis) (HCC)     Hyperlipidemia     Hypertension        History reviewed  No pertinent surgical history  Family History   Problem Relation Age of Onset    Colon cancer Father     Heart disease Maternal Grandmother          Medications have been verified  Objective   Pulse 64   Temp 98 9 °F (37 2 °C)   Resp 14   SpO2 100%        Physical Exam     Physical Exam  Vitals and nursing note reviewed  Constitutional:       General: She is not in acute distress  Appearance: Normal appearance  She is not ill-appearing  HENT:      Head: Normocephalic and atraumatic     Cardiovascular:      Rate and Rhythm: Normal rate    Pulmonary:      Effort: Pulmonary effort is normal  No respiratory distress  Skin:     General: Skin is warm and dry  Capillary Refill: Capillary refill takes less than 2 seconds  Findings: Abscess (small, firm, indurated abscess present R medial inferior buttocks; no bleeding or drainage; no palpable sinus tracts; no fluctuance) present  Neurological:      Mental Status: She is alert and oriented to person, place, and time     Psychiatric:         Behavior: Behavior normal

## 2022-06-08 ENCOUNTER — OFFICE VISIT (OUTPATIENT)
Dept: HEMATOLOGY ONCOLOGY | Facility: MEDICAL CENTER | Age: 77
End: 2022-06-08
Payer: MEDICARE

## 2022-06-08 VITALS
TEMPERATURE: 97.8 F | SYSTOLIC BLOOD PRESSURE: 106 MMHG | DIASTOLIC BLOOD PRESSURE: 60 MMHG | RESPIRATION RATE: 18 BRPM | BODY MASS INDEX: 32.27 KG/M2 | HEIGHT: 64 IN | HEART RATE: 68 BPM | OXYGEN SATURATION: 96 % | WEIGHT: 189 LBS

## 2022-06-08 DIAGNOSIS — I82.4Z1 ACUTE DEEP VEIN THROMBOSIS (DVT) OF DISTAL VEIN OF RIGHT LOWER EXTREMITY (HCC): Primary | ICD-10-CM

## 2022-06-08 PROCEDURE — 99214 OFFICE O/P EST MOD 30 MIN: CPT | Performed by: INTERNAL MEDICINE

## 2022-06-08 NOTE — PROGRESS NOTES
Mono [place]  [birth year]  [address] [address] [facility] [id number]    DISCUSSION/SUMMARY:    68year-old female previously with a right lower extremity (soleal [distal]) DVT)  Just previous to her symptoms, patient had taken a long car ride to [place]  This was likely a provoked incident  Mrs Lenora Orlando completed 3+ months of anticoagulation treatment  Unfortunately, more recently patient developed another left lower extremity DVT  No clear provoking incident at that time  Eliquis was restarted  Mrs Lenora Orlando continues to feel well and clinically there are no concerning signs  The plan is to continue the Eliquis 5 mg twice a day  Patient knows to call the office if she is scheduled for any invasive procedure or surgery  Patient will also monitor for any lower extremity swelling, cords, pain or any acute respiratory issues  As discussed previously, because Mrs Lenora Orlando needs anticoagulation long-term, I do not believe that a thrombophilia evaluation is needed  There is no family history of clots; patient has 1 son who is morbidly obese and had 1 DVT previously  No grandchildren  Patient is to return in 6 months  Patient knows to call the hematology/oncology office if there are any other questions or concerns  Carefully review your medication list and verify that the list is accurate and up-to-date  Please call the hematology/oncology office if there are medications missing from the list, medications on the list that you are not currently taking or if there is a dosage or instruction that is different from how you're taking that medication      Patient goals and areas of care:  Continue with the Eliquis  Barriers to care:  None  Patient is able to self-care   ___________________________________________________________________________________________________    Chief Complaint   Patient presents with   [facility] [place] Follow-up    Recurrent lower extremity DVTs     History of Present Illness:    57-year-old female previously referred for evaluation of a right lower extremity DVT (long car ride)  Mrs Stefania Lopez completed 3+ months of anticoagulation treatment  More recently patient noticed left lower extremity swelling and tenderness in the calf region  Patient also noticed mild difficulty getting up the steps in her house  Patient was seen by her PCP and a Doppler was ordered  Patient was found to have a new left lower extremity DVT  Mrs Stefania Lopez was restarted on Eliquis  Patient noted that the mild pulmonary symptoms went away within a few days of being on the Eliquis  There was no precipitating event for the 2nd DVT  Patient returns for follow-up  Patient was diagnosed with COVID last fall - no serious complications from the infection  Mrs Stefania Lopez states feeling well  No lower extremity swelling, cords or pain  No respiratory problems  No problems with excessive bruising or bleeding, no other problems with the Eliquis  No pending procedures or surgeries  As discussed previously, patient was infected with COVID last year; uncomplicated - subsequently received the vaccine  Patient decided against the booster  Review of Systems   Constitutional: Negative  HENT: Negative  Eyes: Negative  Respiratory: Negative  Cardiovascular: Negative for leg swelling  Gastrointestinal: Negative  Endocrine: Negative  Genitourinary: Negative  Musculoskeletal: Negative  Skin: Negative  Allergic/Immunologic: Negative  Neurological: Negative  Hematological: Negative  Psychiatric/Behavioral: Negative  All other systems reviewed and are negative       Patient Active Problem List   Diagnosis    Acute DVT (deep venous thrombosis) (HCC)     Past Medical History:   Diagnosis Date    DVT (deep venous thrombosis) (HCC)     Hyperlipidemia     Hypertension     Past medical history:  As above, normal childhood illnesses and vaccinations    Ob/gyn:  Mammograms up-to-date and within normal limits, no postmenopausal bleeding    Past surgical history:  None, no blood transfusions    No past surgical history on file  Family History   Problem Relation Age of Onset    Colon cancer Father     Heart disease Maternal Grandmother     Family history:  3 children, 2 in general good health, 3rd child with significant morbid obesity with PE in the past presently on Eliquis no other known familial or genetic diseases    Social History     Socioeconomic History    Marital status: /Civil Union     Spouse name: Not on file    Number of children: Not on file    Years of education: Not on file    Highest education level: Not on file   Occupational History    Not on file   Tobacco Use    Smoking status: Never Smoker    Smokeless tobacco: Never Used   Vaping Use    Vaping Use: Never used   Substance and Sexual Activity    Alcohol use:  Yes     Alcohol/week: 1 0 standard drink     Types: 1 Glasses of wine per week    Drug use: Never    Sexual activity: Not on file   Other Topics Concern    Not on file   Social History Narrative    Not on file     Social Determinants of Health     Financial Resource Strain: Not on file   Food Insecurity: Not on file   Transportation Needs: Not on file   Physical Activity: Not on file   Stress: Not on file   Social Connections: Not on file   Intimate Partner Violence: Not on file   Housing Stability: Not on file    Social history:  No tobacco, alcohol or drug abuse, no known toxic exposure,     Current Outpatient Medications:     apixaban (Eliquis) 5 mg, Take 1 tablet (5 mg total) by mouth 2 (two) times a day, Disp: 60 tablet, Rfl: 5    Calcium Carbonate-Vit D-Min (CALCIUM 1200 PO), Take by mouth, Disp: , Rfl:     Cholecalciferol (VITAMIN D3) 2000 units TABS, Take 2,000 Units by mouth daily, Disp: , Rfl:     co-enzyme Q-10 30 MG capsule, Take 30 mg by mouth 3 (three) times a day, Disp: , Rfl:     doxycycline monohydrate (MONODOX) 100 mg capsule, Take 1 capsule (100 mg total) by mouth 2 (two) times a day for 7 days, Disp: 14 capsule, Rfl: 0    lisinopril-hydrochlorothiazide (PRINZIDE,ZESTORETIC) 20-12 5 MG per tablet, Take by mouth, Disp: , Rfl:     metoprolol succinate (TOPROL-XL) 50 mg 24 hr tablet, Take 50 mg by mouth daily, Disp: , Rfl:     multivitamin (THERAGRAN) TABS, Take 1 tablet by mouth daily, Disp: , Rfl:     simvastatin (ZOCOR) 20 mg tablet, Take 20 mg by mouth every evening, Disp: , Rfl:     Allergies   Allergen Reactions    Penicillins Hives       Vitals:    06/08/22 1323   BP: 106/60   Pulse: 68   Resp: 18   Temp: 97 8 °F (36 6 °C)   SpO2: 96%     Physical Exam  Constitutional:       Appearance: She is well-developed  Comments: Well-nourished female, no respiratory distress   HENT:      Head: Normocephalic and atraumatic  Right Ear: External ear normal       Left Ear: External ear normal    Eyes:      Conjunctiva/sclera: Conjunctivae normal       Pupils: Pupils are equal, round, and reactive to light  Neck:      Comments: Supple, no JVD  Cardiovascular:      Rate and Rhythm: Normal rate and regular rhythm  Heart sounds: Normal heart sounds  Pulmonary:      Effort: Pulmonary effort is normal       Breath sounds: Normal breath sounds  Abdominal:      General: Bowel sounds are normal       Palpations: Abdomen is soft  Comments: Soft, nontender, +bowel sounds, no rigidity rebound, cannot palpate liver or spleen   Musculoskeletal:         General: Normal range of motion  Cervical back: Normal range of motion and neck supple  Skin:     General: Skin is warm  Comments: Warm, moist, good color, no petechiae or ecchymoses   Neurological:      Mental Status: She is alert and oriented to person, place, and time  Deep Tendon Reflexes: Reflexes are normal and symmetric     Psychiatric:         Behavior: Behavior normal  Thought Content: Thought content normal          Judgment: Judgment normal      Extremities:  No lower extremity edema bilaterally, no cords, +bilateral lower extremity varicose veins - same as before, pulses are 1+  Lymphatics:  No adenopathy in the neck, supraclavicular region, axilla and groin bilaterally    Labs    03/18/2022 WBC = 4 4 hemoglobin = 11 8 hematocrit = 34 3 MCV = 85 platelet = 071 neutrophil = 47% BUN = 25 creatinine = 0 98 calcium = 9 3 LFTs WNL    Imaging    03/13/2019 right lower extremity Doppler from [address] [place] [address] conclusion stated that compared to the study from February 11, 2019, the soleal vein DVT had resolved and there was no evidence of DVT in any other right lower extremity superficial or deep vein    02/11/2019 ([address] [place] [address]) bilateral lower extremity venous duplex study conclusion stated acute occlusive DVT of the soleal vein from the distal to mid calf on the right  All other right lower extremity vessels were negative for DVT and SVT  No DVT or SVT in the left lower extremity

## 2022-08-31 PROCEDURE — 88304 TISSUE EXAM BY PATHOLOGIST: CPT | Performed by: PATHOLOGY

## 2022-09-01 ENCOUNTER — LAB REQUISITION (OUTPATIENT)
Dept: LAB | Facility: HOSPITAL | Age: 77
End: 2022-09-01
Payer: MEDICARE

## 2022-09-01 DIAGNOSIS — H02.824 CYSTS OF LEFT UPPER EYELID: ICD-10-CM

## 2022-09-13 PROCEDURE — 88304 TISSUE EXAM BY PATHOLOGIST: CPT | Performed by: PATHOLOGY

## 2022-10-05 ENCOUNTER — TELEPHONE (OUTPATIENT)
Dept: HEMATOLOGY ONCOLOGY | Facility: CLINIC | Age: 77
End: 2022-10-05

## 2022-10-05 DIAGNOSIS — I82.4Z1 ACUTE DEEP VEIN THROMBOSIS (DVT) OF DISTAL VEIN OF RIGHT LOWER EXTREMITY (HCC): ICD-10-CM

## 2022-10-05 NOTE — TELEPHONE ENCOUNTER
Medication Refill     Who is Calling  Patient   Medication apixaban (Eliquis) 5 mg      How many pills left  10   Preferred Pharmacy / [facility] [address] [facility] [address] [facility] [address], [address] [facility] [address]   Phone:  [phone]  Fax:  [phone]    Who is your Physician? Dr Melissa Lim    Call back number [phone]   Relevant Information  Please notify patient when medication is placed

## 2022-10-12 ENCOUNTER — APPOINTMENT (OUTPATIENT)
Dept: RADIOLOGY | Facility: CLINIC | Age: 77
End: 2022-10-12
Payer: MEDICARE

## 2022-10-12 ENCOUNTER — OFFICE VISIT (OUTPATIENT)
Dept: OBGYN CLINIC | Facility: CLINIC | Age: 77
End: 2022-10-12
Payer: MEDICARE

## 2022-10-12 VITALS
DIASTOLIC BLOOD PRESSURE: 84 MMHG | WEIGHT: 193.6 LBS | HEART RATE: 65 BPM | SYSTOLIC BLOOD PRESSURE: 134 MMHG | HEIGHT: 64 IN | BODY MASS INDEX: 33.05 KG/M2

## 2022-10-12 DIAGNOSIS — M19.012 PRIMARY OSTEOARTHRITIS OF LEFT SHOULDER: ICD-10-CM

## 2022-10-12 DIAGNOSIS — M19.011 PRIMARY OSTEOARTHRITIS OF RIGHT SHOULDER: Primary | ICD-10-CM

## 2022-10-12 DIAGNOSIS — M25.512 BILATERAL SHOULDER PAIN, UNSPECIFIED CHRONICITY: ICD-10-CM

## 2022-10-12 DIAGNOSIS — M25.511 BILATERAL SHOULDER PAIN, UNSPECIFIED CHRONICITY: ICD-10-CM

## 2022-10-12 PROCEDURE — 20610 DRAIN/INJ JOINT/BURSA W/O US: CPT | Performed by: ORTHOPAEDIC SURGERY

## 2022-10-12 PROCEDURE — 99204 OFFICE O/P NEW MOD 45 MIN: CPT | Performed by: ORTHOPAEDIC SURGERY

## 2022-10-12 PROCEDURE — 73030 X-RAY EXAM OF SHOULDER: CPT

## 2022-10-12 RX ORDER — LIDOCAINE HYDROCHLORIDE 10 MG/ML
4 INJECTION, SOLUTION INFILTRATION; PERINEURAL
Status: COMPLETED | OUTPATIENT
Start: 2022-10-12 | End: 2022-10-12

## 2022-10-12 RX ORDER — DEXAMETHASONE SODIUM PHOSPHATE 100 MG/10ML
40 INJECTION INTRAMUSCULAR; INTRAVENOUS
Status: COMPLETED | OUTPATIENT
Start: 2022-10-12 | End: 2022-10-12

## 2022-10-12 RX ADMIN — LIDOCAINE HYDROCHLORIDE 4 ML: 10 INJECTION, SOLUTION INFILTRATION; PERINEURAL at 12:05

## 2022-10-12 RX ADMIN — DEXAMETHASONE SODIUM PHOSPHATE 40 MG: 100 INJECTION INTRAMUSCULAR; INTRAVENOUS at 12:05

## 2022-10-12 NOTE — PROGRESS NOTES
Assessment/Plan:  1  Primary osteoarthritis of right shoulder  XR shoulder 2+ vw left    XR shoulder 2+ vw right    Large joint arthrocentesis: R glenohumeral   2  Primary osteoarthritis of left shoulder  Large joint arthrocentesis: L glenohumeral     Scribe Attestation    I,:  Nacho Chanel am acting as a scribe while in the presence of the attending physician :       I,:  Sarah Pace MD personally performed the services described in this documentation    as scribed in my presence :           Sarah Ness upon examination and review of the x-rays of the left and right shoulders does demonstrate severe bilateral glenohumeral joint osteoarthritis  There is significant joint line space narrowing with large osteophytes inferiorly bilaterally  I did remark that this is contributing significantly to her range of motion deficits as well as pain and weakness in the shoulders  I did discuss non operative care in the form of activity modification, ice, rest and oral analgesics  I did note that she could consider physical therapy or a physician directed exercise program   Additionally I did note that she could consider more invasive treatment options at this into a steroid injection  If all non operative care fail she could consider total shoulder arthroplasty in the future  She verbalized understanding and wished to undergo bilateral glenohumeral joint intra-articular steroid injections  She tolerated the injections well with no complications post injection instructions were provided  She was also provide her with a physician directed exercise program   My  instruct her on proper execution of these exercises today  She may continue with activities of daily living as tolerated with no restrictions  I did note that she get to steroid injections per shoulder joint per year if she wishes  She verbalized understanding and had no further questions    I will see her back on an as-needed basis  Large joint arthrocentesis: R glenohumeral  [place] Protocol:  Consent: Verbal consent obtained  Risks and benefits: risks, benefits and alternatives were discussed  Consent given by: patient  Timeout called at: 10/12/2022 12:03 PM   Patient understanding: patient states understanding of the procedure being performed  Radiology Images displayed and confirmed  If images not available, report reviewed: imaging studies available  Patient identity confirmed: verbally with patient    Supporting Documentation  Indications: pain   Procedure Details  Location: shoulder - R glenohumeral  Needle size: 22 G  Ultrasound guidance: no  Approach: anterior  Medications administered: 40 mg dexamethasone 100 mg/10 mL; 4 mL lidocaine 1 %    Patient tolerance: patient tolerated the procedure well with no immediate complications  Dressing:  Sterile dressing applied    Large joint arthrocentesis: L glenohumeral  Universal Protocol:  Consent: Verbal consent obtained  Risks and benefits: risks, benefits and alternatives were discussed  Consent given by: patient  Timeout called at: 10/12/2022 12:04 PM   Patient understanding: patient states understanding of the procedure being performed  Radiology Images displayed and confirmed  If images not available, report reviewed: imaging studies available    Supporting Documentation  Indications: pain   Procedure Details  Location: shoulder - L glenohumeral  Needle size: 22 G  Ultrasound guidance: no  Approach: anterior  Medications administered: 40 mg dexamethasone 100 mg/10 mL; 4 mL lidocaine 1 %    Patient tolerance: patient tolerated the procedure well with no immediate complications  Dressing:  Sterile dressing applied        Subjective:   Claudeen Aly is a 68 y o  female who presents for initial evaluation of her left and right shoulders  She states she had an acute onset of pain for approximately 2 months    She denies a traumatic injury that has resulted in her painful symptoms  She states her pain is due to wax and wane and can be quite severe into either shoulder  She does not remark on 1 shoulder being worse than the other  She states that overhead reaching activities will exacerbate her pain  There are times when she feels weak into the shoulder as well  She states she is quite active and does try to do yd work and states that pushing activities to not significantly exacerbate her pain  Currently she is experiencing moderate to severe aching and sometimes sharp pain anteriorly and laterally at the shoulder  Her pain is better while rest   She denies any distal paresthesias  She also denies a history of diabetes  Review of Systems   Constitutional: Negative for chills, fever and unexpected weight change  HENT: Negative for hearing loss, nosebleeds and sore throat  Eyes: Negative for pain, redness and visual disturbance  Respiratory: Negative for cough, shortness of breath and wheezing  Cardiovascular: Negative for chest pain, palpitations and leg swelling  Gastrointestinal: Negative for abdominal pain, nausea and vomiting  Endocrine: Negative for polydipsia and polyuria  Genitourinary: Negative for dysuria and hematuria  Musculoskeletal: Positive for arthralgias and myalgias  See HPI   Skin: Negative for rash and wound  Neurological: Negative for dizziness, numbness and headaches  Psychiatric/Behavioral: Negative for decreased concentration and suicidal ideas  The patient is not nervous/anxious  Past Medical History:   Diagnosis Date   • DVT (deep venous thrombosis) (HCC)    • Hyperlipidemia    • Hypertension        History reviewed  No pertinent surgical history      Family History   Problem Relation Age of Onset   • Colon cancer Father    • Heart disease Maternal Grandmother        Social History     Occupational History   • Not on file   Tobacco Use   • Smoking status: Never Smoker   • Smokeless tobacco: Never Used   Vaping Use   • Vaping Use: Never used   Substance and Sexual Activity   • Alcohol use: Yes     Alcohol/week: 1 0 standard drink     Types: 1 Glasses of wine per week   • Drug use: Never   • Sexual activity: Not on file         Current Outpatient Medications:   •  apixaban (Eliquis) 5 mg, Take 1 tablet (5 mg total) by mouth 2 (two) times a day, Disp: 60 tablet, Rfl: 5  •  Calcium Carbonate-Vit D-Min (CALCIUM 1200 PO), Take by mouth, Disp: , Rfl:   •  Cholecalciferol (VITAMIN D3) 2000 units TABS, Take 2,000 Units by mouth daily, Disp: , Rfl:   •  co-enzyme Q-10 30 MG capsule, Take 30 mg by mouth 3 (three) times a day, Disp: , Rfl:   •  lisinopril-hydrochlorothiazide (PRINZIDE,ZESTORETIC) 20-12 5 MG per tablet, Take by mouth, Disp: , Rfl:   •  metoprolol succinate (TOPROL-XL) 50 mg 24 hr tablet, Take 50 mg by mouth daily, Disp: , Rfl:   •  multivitamin (THERAGRAN) TABS, Take 1 tablet by mouth daily, Disp: , Rfl:   •  simvastatin (ZOCOR) 20 mg tablet, Take 20 mg by mouth every evening, Disp: , Rfl:     Allergies   Allergen Reactions   • Penicillins Hives       Objective:  Vitals:    10/12/22 1056   BP: 134/84   Pulse: 65       Right Shoulder Exam     Tenderness   Right shoulder tenderness location: anterior glenohumeral     Range of Motion   Active abduction: 110   External rotation: 40   Internal rotation 0 degrees: L5     Muscle Strength   Abduction: 5/5   Internal rotation: 5/5   External rotation: 5/5     Other   Erythema: absent  Scars: absent  Sensation: normal  Pulse: present      Left Shoulder Exam     Tenderness   Left shoulder tenderness location: anterior glenohumeral     Range of Motion   Active abduction: 110   External rotation: 40   Internal rotation 0 degrees: L5     Muscle Strength   Abduction: 5/5   Internal rotation: 5/5   External rotation: 5/5     Other   Erythema: absent  Scars: absent  Sensation: normal  Pulse: present             Physical Exam  Vitals reviewed     HENT:      Head: Normocephalic and atraumatic  Eyes:      General:         Right eye: No discharge  Left eye: No discharge  Conjunctiva/sclera: Conjunctivae normal       Pupils: Pupils are equal, round, and reactive to light  Cardiovascular:      Rate and Rhythm: Normal rate  Pulmonary:      Effort: Pulmonary effort is normal  No respiratory distress  Musculoskeletal:      Cervical back: Normal range of motion and neck supple  Comments: As noted in HPI   Skin:     General: Skin is warm and dry  Neurological:      Mental Status: She is alert and oriented to person, place, and time  I have personally reviewed pertinent films in PACS and my interpretation is as follows:    X-rays of the left shoulder demonstrate severe glenohumeral joint osteoarthritis with severe joint line narrowing, subchondral sclerosis and osteophyte formation inferiorly the humeral head and glenoid  No acute fractures demonstrated  No obvious lytic or blastic lesions demonstrated  X-rays of the right shoulder demonstrate severe glenohumeral joint osteoarthritis with severe joint line narrowing, subchondral sclerosis and osteophyte formation inferiorly the humeral head and glenoid  No acute fractures demonstrated  No obvious lytic or blastic lesions demonstrated  This document was created using speech voice recognition software  Grammatical errors, random word insertions, pronoun errors, and incomplete sentences are an occasional consequence of this system due to software limitations, ambient noise, and hardware issues  Any formal questions or concerns about content, text, or information contained within the body of this dictation should be directly addressed to the provider for clarification

## 2022-11-18 ENCOUNTER — OFFICE VISIT (OUTPATIENT)
Dept: URGENT CARE | Facility: CLINIC | Age: 77
End: 2022-11-18

## 2022-11-18 VITALS
HEART RATE: 69 BPM | OXYGEN SATURATION: 99 % | BODY MASS INDEX: 32.78 KG/M2 | DIASTOLIC BLOOD PRESSURE: 77 MMHG | RESPIRATION RATE: 20 BRPM | TEMPERATURE: 97.9 F | HEIGHT: 64 IN | WEIGHT: 192 LBS | SYSTOLIC BLOOD PRESSURE: 130 MMHG

## 2022-11-18 DIAGNOSIS — B96.89 ACUTE BACTERIAL BRONCHITIS: Primary | ICD-10-CM

## 2022-11-18 DIAGNOSIS — J20.8 ACUTE BACTERIAL BRONCHITIS: Primary | ICD-10-CM

## 2022-11-18 RX ORDER — AZITHROMYCIN 250 MG/1
TABLET, FILM COATED ORAL
Qty: 6 TABLET | Refills: 0 | Status: SHIPPED | OUTPATIENT
Start: 2022-11-18 | End: 2022-11-22

## 2022-11-18 RX ORDER — BENZONATATE 200 MG/1
200 CAPSULE ORAL 3 TIMES DAILY PRN
Qty: 20 CAPSULE | Refills: 0 | Status: SHIPPED | OUTPATIENT
Start: 2022-11-18

## 2022-11-18 NOTE — PROGRESS NOTES
[facility]        NAME: Pernell Krause is a 68 y o  female  : 1945    MRN: [id number]  DATE: 2022  TIME: 10:40 AM    Assessment and Plan   Acute bacterial bronchitis [J20 8, B96 89]  1  Acute bacterial bronchitis  azithromycin (ZITHROMAX) 250 mg tablet    benzonatate (TESSALON) 200 MG capsule            Patient Instructions     Continue to monitor symptoms  If new or worsening symptoms develop, go immediately to Er  Drink plenty of fluids  Follow up with Family Doctor this week  Chief Complaint     Chief Complaint   Patient presents with   • Cold Like Symptoms     URI s/s x 1 week  History of Present Illness       Cough  This is a new problem  Episode onset: Over a week ago  Took at home covid which was negative3  The problem has been gradually worsening  The problem occurs every few minutes  The cough is productive of brown sputum  Associated symptoms include nasal congestion, postnasal drip and a sore throat  Pertinent negatives include no chest pain, chills, ear pain, fever, headaches, myalgias, rash, rhinorrhea, shortness of breath or wheezing  Nothing aggravates the symptoms  Treatments tried: Mucinex DM  The treatment provided mild relief  There is no history of bronchitis  Review of Systems   Review of Systems   Constitutional: Negative for chills, diaphoresis, fatigue and fever  HENT: Positive for postnasal drip, sinus pressure, sinus pain, sneezing and sore throat  Negative for congestion, ear pain, rhinorrhea and voice change  Eyes: Negative  Respiratory: Positive for cough  Negative for chest tightness, shortness of breath and wheezing  Cardiovascular: Negative for chest pain and palpitations  Gastrointestinal: Negative for abdominal pain, constipation, diarrhea, nausea and vomiting  Endocrine: Negative  Genitourinary: Negative for dysuria  Musculoskeletal: Negative for back pain, myalgias and neck pain     Skin: Negative for pallor and rash  Allergic/Immunologic: Negative  Neurological: Negative for dizziness, syncope and headaches  Hematological: Negative  Psychiatric/Behavioral: Negative  Current Medications       Current Outpatient Medications:   •  azithromycin (ZITHROMAX) 250 mg tablet, Take 2 tablets today then 1 tablet daily x 4 days, Disp: 6 tablet, Rfl: 0  •  benzonatate (TESSALON) 200 MG capsule, Take 1 capsule (200 mg total) by mouth 3 (three) times a day as needed for cough, Disp: 20 capsule, Rfl: 0  •  apixaban (Eliquis) 5 mg, Take 1 tablet (5 mg total) by mouth 2 (two) times a day, Disp: 60 tablet, Rfl: 5  •  Calcium Carbonate-Vit D-Min (CALCIUM 1200 PO), Take by mouth, Disp: , Rfl:   •  Cholecalciferol (VITAMIN D3) 2000 units TABS, Take 2,000 Units by mouth daily, Disp: , Rfl:   •  co-enzyme Q-10 30 MG capsule, Take 30 mg by mouth 3 (three) times a day, Disp: , Rfl:   •  lisinopril-hydrochlorothiazide (PRINZIDE,ZESTORETIC) 20-12 5 MG per tablet, Take by mouth, Disp: , Rfl:   •  metoprolol succinate (TOPROL-XL) 50 mg 24 hr tablet, Take 50 mg by mouth daily, Disp: , Rfl:   •  multivitamin (THERAGRAN) TABS, Take 1 tablet by mouth daily, Disp: , Rfl:   •  simvastatin (ZOCOR) 20 mg tablet, Take 20 mg by mouth every evening, Disp: , Rfl:     Current Allergies     Allergies as of 11/18/2022 - Reviewed 11/18/2022   Allergen Reaction Noted   • Penicillins Hives 03/04/2019            The following portions of the patient's history were reviewed and updated as appropriate: allergies, current medications, past family history, past medical history, past social history, past surgical history and problem list      Past Medical History:   Diagnosis Date   • DVT (deep venous thrombosis) (HCC)    • Hyperlipidemia    • Hypertension        History reviewed  No pertinent surgical history      Family History   Problem Relation Age of Onset   • Colon cancer Father    • Heart disease Maternal Grandmother          Medications have been verified  Objective   /77   Pulse 69   Temp 97 9 °F (36 6 °C)   Resp 20   Ht 5' 4" (1 626 m)   Wt 87 1 kg (192 lb)   SpO2 99%   BMI 32 96 kg/m²        Physical Exam     Physical Exam  Vitals and nursing note reviewed  Constitutional:       General: She is not in acute distress  Appearance: Normal appearance  She is well-developed and well-nourished  She is not ill-appearing or diaphoretic  HENT:      Head: Normocephalic and atraumatic  Right Ear: Tympanic membrane, ear canal and external ear normal       Left Ear: Tympanic membrane, ear canal and external ear normal       Nose: Congestion present  No rhinorrhea  Mouth/Throat:      Pharynx: Posterior oropharyngeal erythema present  No oropharyngeal exudate  Eyes:      General:         Right eye: No discharge  Left eye: No discharge  Conjunctiva/sclera: Conjunctivae normal    Cardiovascular:      Rate and Rhythm: Normal rate and regular rhythm  Pulses: Intact distal pulses  Heart sounds: Normal heart sounds  Pulmonary:      Effort: Pulmonary effort is normal  No respiratory distress  Breath sounds: Normal breath sounds  No wheezing, rhonchi or rales  Musculoskeletal:      Cervical back: Normal range of motion and neck supple  Lymphadenopathy:      Cervical: No cervical adenopathy  Skin:     General: Skin is warm  Capillary Refill: Capillary refill takes less than 2 seconds  Findings: No rash  Neurological:      Mental Status: She is alert

## 2022-11-18 NOTE — PATIENT INSTRUCTIONS
Continue to monitor symptoms  If new or worsening symptoms develop, go immediately to Er  Drink plenty of fluids  Follow up with Family Doctor this week  Acute Bronchitis   WHAT YOU NEED TO KNOW:   Acute bronchitis is swelling and irritation in your lungs  It is usually caused by a virus and most often happens in the winter  Bronchitis may also be caused by bacteria or by a chemical irritant, such as smoke  DISCHARGE INSTRUCTIONS:   Return to the emergency department if:   You cough up blood  Your lips or fingernails turn blue  You feel like you are not getting enough air when you breathe  Call your doctor if:   Your symptoms do not go away or get worse, even after treatment  Your cough does not get better within 4 weeks  You have questions or concerns about your condition or care  Medicines: You may  need any of the following:  Cough suppressants  decrease your urge to cough  Decongestants  help loosen mucus in your lungs and make it easier to cough up  This can help you breathe easier  Inhalers  may be given  Your healthcare provider may give you one or more inhalers to help you breathe easier and cough less  An inhaler gives your medicine to open your airways  Ask your healthcare provider to show you how to use your inhaler correctly  Antibiotics  may be given for up to 5 days if your bronchitis is caused by bacteria  Acetaminophen  decreases pain and fever  It is available without a doctor's order  Ask how much to take and how often to take it  Follow directions  Read the labels of all other medicines you are using to see if they also contain acetaminophen, or ask your doctor or pharmacist  Acetaminophen can cause liver damage if not taken correctly  Do not use more than 4 grams (4,000 milligrams) total of acetaminophen in one day  NSAIDs  help decrease swelling and pain or fever  This medicine is available with or without a doctor's order   NSAIDs can cause stomach bleeding or kidney problems in certain people  If you take blood thinner medicine, always ask your healthcare provider if NSAIDs are safe for you  Always read the medicine label and follow directions  Take your medicine as directed  Contact your healthcare provider if you think your medicine is not helping or if you have side effects  Tell him of her if you are allergic to any medicine  Keep a list of the medicines, vitamins, and herbs you take  Include the amounts, and when and why you take them  Bring the list or the pill bottles to follow-up visits  Carry your medicine list with you in case of an emergency  Self-care:   Drink liquids as directed  You may need to drink more liquids than usual to stay hydrated  Ask how much liquid to drink each day and which liquids are best for you  Use a cool mist humidifier  to increase air moisture in your home  This may make it easier for you to breathe and help decrease your cough  Get more rest   Rest helps your body to heal  Slowly start to do more each day  Rest when you feel it is needed  Avoid irritants in the air  Avoid chemicals, fumes, and dust  Wear a face mask if you must work around dust or fumes  Stay inside on days when air pollution levels are high  If you have allergies, stay inside when pollen counts are high  Do not use aerosol products, such as spray-on deodorant, bug spray, and hair spray  Do not smoke or be around others who are smoking  Nicotine and other chemicals in cigarettes and cigars can cause lung damage  Ask your healthcare provider for information if you currently smoke and need help to quit  E-cigarettes or smokeless tobacco still contain nicotine  Talk to your healthcare provider before you use these products  Prevent acute bronchitis:       Ask about vaccines you may need  Get a flu vaccine each year as soon as recommended, usually in September or October   Ask your healthcare provider if you should also get a pneumonia or COVID-19 vaccine  Your healthcare provider can tell you if you should also get other vaccines, and when to get them  Prevent the spread of germs  You can decrease your risk for acute bronchitis and other illnesses by doing the following:     Wash your hands often with soap and water  Carry germ-killing hand lotion or gel with you  You can use the lotion or gel to clean your hands when soap and water are not available  Do not touch your eyes, nose, or mouth unless you have washed your hands first     Always cover your mouth when you cough to prevent the spread of germs  It is best to cough into a tissue or your shirt sleeve instead of into your hand  Ask those around you to cover their mouths when they cough  Try to avoid people who have a cold or the flu  If you are sick, stay away from others as much as possible  Follow up with your doctor as directed:  Write down questions you have so you will remember to ask them during your follow-up visits  © Copyright [employer] 2022 Information is for End User's use only and may not be sold, redistributed or otherwise used for commercial purposes  All illustrations and images included in CareNotes® are the copyrighted property of [employer]  or [employer]   The above information is an  only  It is not intended as medical advice for individual conditions or treatments  Talk to your doctor, nurse or pharmacist before following any medical regimen to see if it is safe and effective for you

## 2022-12-05 ENCOUNTER — TELEPHONE (OUTPATIENT)
Dept: HEMATOLOGY ONCOLOGY | Facility: MEDICAL CENTER | Age: 77
End: 2022-12-05

## 2022-12-11 ENCOUNTER — TELEPHONE (OUTPATIENT)
Dept: FAMILY MEDICINE CLINIC | Facility: CLINIC | Age: 77
End: 2022-12-11

## 2022-12-11 PROBLEM — E53.8 B12 DEFICIENCY: Status: RESOLVED | Noted: 2020-11-17 | Resolved: 2022-12-11

## 2022-12-11 PROBLEM — E53.8 B12 DEFICIENCY: Status: ACTIVE | Noted: 2020-11-17

## 2022-12-11 PROBLEM — K21.9 GASTROESOPHAGEAL REFLUX DISEASE: Status: ACTIVE | Noted: 2020-11-17

## 2022-12-11 PROBLEM — Z86.718 HISTORY OF DVT OF LOWER EXTREMITY: Status: ACTIVE | Noted: 2019-10-30

## 2022-12-11 PROBLEM — I83.899 VARICOSE VEINS WITH SWELLING: Status: ACTIVE | Noted: 2020-11-17

## 2022-12-11 PROBLEM — I10 HYPERTENSION, ESSENTIAL: Status: ACTIVE | Noted: 2019-10-30

## 2022-12-11 PROBLEM — E78.49 OTHER HYPERLIPIDEMIA: Status: ACTIVE | Noted: 2022-12-11

## 2022-12-11 PROBLEM — E55.9 VITAMIN D DEFICIENCY: Status: ACTIVE | Noted: 2020-11-17

## 2022-12-11 PROBLEM — I82.409 ACUTE DVT (DEEP VENOUS THROMBOSIS) (HCC): Status: RESOLVED | Noted: 2019-03-04 | Resolved: 2022-12-11

## 2022-12-11 PROBLEM — M17.11 PRIMARY OSTEOARTHRITIS OF RIGHT KNEE: Status: ACTIVE | Noted: 2020-11-17

## 2022-12-11 PROBLEM — I73.9 PERIPHERAL ARTERIAL DISEASE (HCC): Status: ACTIVE | Noted: 2020-11-17

## 2022-12-11 PROBLEM — I25.10 CORONARY ARTERY DISEASE INVOLVING NATIVE CORONARY ARTERY OF NATIVE HEART WITHOUT ANGINA PECTORIS: Status: ACTIVE | Noted: 2019-10-30

## 2022-12-11 PROBLEM — I25.3 INTERATRIAL SEPTAL ANEURYSM WITH PFO: Status: ACTIVE | Noted: 2021-01-21

## 2022-12-11 PROBLEM — R31.29 MICROSCOPIC HEMATURIA: Status: ACTIVE | Noted: 2020-11-17

## 2022-12-11 PROBLEM — G58.9 MONONEURITIS: Status: ACTIVE | Noted: 2020-11-17

## 2022-12-11 PROBLEM — Q21.12 INTERATRIAL SEPTAL ANEURYSM WITH PFO: Status: ACTIVE | Noted: 2021-01-21

## 2022-12-11 PROBLEM — M71.21 UNRUPTURED CYST OF RIGHT POPLITEAL SPACE: Status: ACTIVE | Noted: 2020-11-17

## 2022-12-11 PROBLEM — J30.2 SEASONAL ALLERGIES: Status: ACTIVE | Noted: 2020-11-17

## 2022-12-12 ENCOUNTER — OFFICE VISIT (OUTPATIENT)
Dept: HEMATOLOGY ONCOLOGY | Facility: MEDICAL CENTER | Age: 77
End: 2022-12-12

## 2022-12-12 VITALS
BODY MASS INDEX: 33.46 KG/M2 | TEMPERATURE: 97.8 F | HEART RATE: 60 BPM | HEIGHT: 64 IN | DIASTOLIC BLOOD PRESSURE: 80 MMHG | WEIGHT: 196 LBS | OXYGEN SATURATION: 97 % | SYSTOLIC BLOOD PRESSURE: 130 MMHG | RESPIRATION RATE: 18 BRPM

## 2022-12-12 DIAGNOSIS — I83.899 VARICOSE VEINS WITH SWELLING: Primary | ICD-10-CM

## 2022-12-12 DIAGNOSIS — Z86.718 HISTORY OF DVT OF LOWER EXTREMITY: ICD-10-CM

## 2022-12-12 NOTE — PROGRESS NOTES
Marlis Sicard  [birth year]  [facility] [phone]    DISCUSSION/SUMMARY:    68year-old female previously with a right lower extremity (soleal [distal]) DVT)  Just previous to her symptoms, patient had taken a long car ride to [place]  This was likely a provoked incident  Mrs Gurinder Bustillos completed 3+ months of anticoagulation treatment  Unfortunately, more recently patient developed another left lower extremity DVT  No clear provoking incident at that time  Eliquis was restarted  Mrs Gurinder Bustillos continues to feel well and clinically there are no concerning signs  The plan is to continue the Eliquis 5 mg twice a day  Patient knows to call the office if she is scheduled for any invasive procedure or surgery  Patient will also monitor for any lower extremity swelling, cords, pain or any acute respiratory issues  As discussed previously, because Mrs Gurinder Bustillos needs anticoagulation long-term, I do not believe that a thrombophilia evaluation is needed  There is no family history of clots; patient has 1 son who is morbidly obese and had 1 DVT previously  No grandchildren  Patient is to return in 6 months  Patient knows to call the hematology/oncology office if there are any other questions or concerns  Patient is changing her PCP to Katty - appointment is pending for approximately 2 weeks  Hematology will use the pending routine medical laboratory tests -BUN/creatinine needs to be rechecked  Carefully review your medication list and verify that the list is accurate and up-to-date  Please call the hematology/oncology office if there are medications missing from the list, medications on the list that you are not currently taking or if there is a dosage or instruction that is different from how you're taking that medication      Patient goals and areas of care:  Continue with the Eliquis  Barriers to care:  None  Patient is able to self-care   ___________________________________________________________________________________________________    Chief Complaint   Patient presents with   • Follow-up   • Recurrent lower extremity DVTs     History of Present Illness:    72-year-old female previously referred for evaluation of a right lower extremity DVT (long car ride)  Mrs Ana Maria Del Rosario completed 3+ months of anticoagulation treatment  Subsequently patient noticed left lower extremity swelling and tenderness in the calf region  Patient also noticed mild difficulty getting up the steps in her house  Patient was seen by her PCP and a Doppler was ordered  Patient was found to have a new left lower extremity DVT  Mrs Ana Maria Del Rosairo was restarted on Eliquis  Patient noted that the mild pulmonary symptoms went away within a few days of being on the Eliquis  There was no precipitating event for the 2nd DVT  Patient on long-term Eliquis and returns for follow-up  Patient was diagnosed with COVID last fall 2021 - no serious complications from the infection  Mrs Ana Maria Del Rosairo states feeling well  The most part, no lower extremity swelling but patient states having occasional tingling in right varicose veins -no pain, redness for 4 days  No lower extremity swelling, no respiratory issues  No problems with excessive bruising or bleeding, no other problems with the Eliquis  As discussed previously, patient was infected with COVID last year; uncomplicated - subsequently received the vaccine  Review of Systems   Constitutional: Negative  HENT: Negative  Eyes: Negative  Respiratory: Negative  Cardiovascular: Negative for leg swelling  Gastrointestinal: Negative  Endocrine: Negative  Genitourinary: Negative  Musculoskeletal: Negative  Skin: Negative  Allergic/Immunologic: Negative  Neurological: Negative  Hematological: Negative      Psychiatric/Behavioral: Negative  All other systems reviewed and are negative  Patient Active Problem List   Diagnosis   • Coronary artery disease involving native coronary artery of native heart without angina pectoris   • Gastroesophageal reflux disease   • History of DVT of lower extremity   • Hypertension, essential   • Interatrial septal aneurysm with PFO   • Primary osteoarthritis of right knee   • Microscopic hematuria   • Mononeuritis   • Peripheral arterial disease (HCC)   • Seasonal allergies   • Unruptured cyst of right popliteal space   • Varicose veins with swelling   • Vitamin D deficiency   • Other hyperlipidemia     Past Medical History:   Diagnosis Date   • DVT (deep venous thrombosis) (HCC)    • Hyperlipidemia    • Hypertension     Past medical history:  As above, normal childhood illnesses and vaccinations    Ob/gyn:  Mammograms up-to-date and within normal limits, no postmenopausal bleeding    Past surgical history:  None, no blood transfusions    No past surgical history on file  Family History   Problem Relation Age of Onset   • Colon cancer Father    • Heart disease Maternal Grandmother     Family history:  3 children, 2 in general good health, 3rd child with significant morbid obesity with PE in the past presently on Eliquis no other known familial or genetic diseases    Social History     Socioeconomic History   • Marital status: /Civil Union     Spouse name: Not on file   • Number of children: Not on file   • Years of education: Not on file   • Highest education level: Not on file   Occupational History   • Not on file   Tobacco Use   • Smoking status: Never   • Smokeless tobacco: Never   Vaping Use   • Vaping Use: Never used   Substance and Sexual Activity   • Alcohol use:  Yes     Alcohol/week: 1 0 standard drink     Types: 1 Glasses of wine per week   • Drug use: Never   • Sexual activity: Not on file   Other Topics Concern   • Not on file   Social History Narrative   • Not on file     Social Determinants of Health     Financial Resource Strain: Not on file   Food Insecurity: Not on file   Transportation Needs: Not on file   Physical Activity: Not on file   Stress: Not on file   Social Connections: Not on file   Intimate Partner Violence: Not on file   Housing Stability: Not on file    Social history:  No tobacco, alcohol or drug abuse, no known toxic exposure,     Current Outpatient Medications:   •  apixaban (Eliquis) 5 mg, Take 1 tablet (5 mg total) by mouth 2 (two) times a day, Disp: 60 tablet, Rfl: 5  •  benzonatate (TESSALON) 200 MG capsule, Take 1 capsule (200 mg total) by mouth 3 (three) times a day as needed for cough, Disp: 20 capsule, Rfl: 0  •  Calcium Carbonate-Vit D-Min (CALCIUM 1200 PO), Take by mouth, Disp: , Rfl:   •  Cholecalciferol (VITAMIN D3) 2000 units TABS, Take 2,000 Units by mouth daily, Disp: , Rfl:   •  co-enzyme Q-10 30 MG capsule, Take 30 mg by mouth 3 (three) times a day, Disp: , Rfl:   •  lisinopril-hydrochlorothiazide (PRINZIDE,ZESTORETIC) 20-12 5 MG per tablet, Take by mouth, Disp: , Rfl:   •  metoprolol succinate (TOPROL-XL) 50 mg 24 hr tablet, Take 50 mg by mouth daily, Disp: , Rfl:   •  multivitamin (THERAGRAN) TABS, Take 1 tablet by mouth daily, Disp: , Rfl:   •  simvastatin (ZOCOR) 20 mg tablet, Take 20 mg by mouth every evening, Disp: , Rfl:     Allergies   Allergen Reactions   • Penicillins Hives       Vitals:    12/12/22 1036   BP: 130/80   Pulse: 60   Resp: 18   Temp: 97 8 °F (36 6 °C)   SpO2: 97%     Physical Exam  Constitutional:       Appearance: She is well-developed  Comments: Well-nourished female, no respiratory distress   HENT:      Head: Normocephalic and atraumatic  Right Ear: External ear normal       Left Ear: External ear normal    Eyes:      Conjunctiva/sclera: Conjunctivae normal       Pupils: Pupils are equal, round, and reactive to light     Neck:      Comments: Supple, no JVD  Cardiovascular:      Rate and Rhythm: Normal rate and regular rhythm  Heart sounds: Normal heart sounds  Pulmonary:      Effort: Pulmonary effort is normal       Breath sounds: Normal breath sounds  Abdominal:      General: Bowel sounds are normal       Palpations: Abdomen is soft  Comments: Soft, nontender, +bowel sounds, no rigidity rebound, cannot palpate liver or spleen   Musculoskeletal:         General: Normal range of motion  Cervical back: Normal range of motion and neck supple  Skin:     General: Skin is warm  Comments: Warm, moist, good color, no petechiae or ecchymoses   Neurological:      Mental Status: She is alert and oriented to person, place, and time  Deep Tendon Reflexes: Reflexes are normal and symmetric  Psychiatric:         Behavior: Behavior normal          Thought Content: Thought content normal          Judgment: Judgment normal      Extremities:  No lower extremity edema bilaterally, no cords, +bilateral lower extremity varicose veins - same as before -no superficial cords, pulses are 1+  Lymphatics:  No adenopathy in the neck, supraclavicular region, axilla and groin bilaterally    Labs    03/18/2022 WBC = 4 4 hemoglobin = 11 8 hematocrit = 34 3 MCV = 85 platelet = 350 neutrophil = 47% BUN = 25 creatinine = 0 98 calcium = 9 3 LFTs WNL    Imaging    03/13/2019 right lower extremity Doppler from [facility] conclusion stated that compared to the study from February 11, 2019, the soleal vein DVT had resolved and there was no evidence of DVT in any other right lower extremity superficial or deep vein    02/11/2019 ([facility]) bilateral lower extremity venous duplex study conclusion stated acute occlusive DVT of the soleal vein from the distal to mid calf on the right  All other right lower extremity vessels were negative for DVT and SVT  No DVT or SVT in the left lower extremity

## 2022-12-14 ENCOUNTER — OFFICE VISIT (OUTPATIENT)
Dept: FAMILY MEDICINE CLINIC | Facility: CLINIC | Age: 77
End: 2022-12-14

## 2022-12-14 VITALS
TEMPERATURE: 97.2 F | BODY MASS INDEX: 33.09 KG/M2 | HEART RATE: 59 BPM | DIASTOLIC BLOOD PRESSURE: 84 MMHG | HEIGHT: 64 IN | RESPIRATION RATE: 18 BRPM | WEIGHT: 193.8 LBS | SYSTOLIC BLOOD PRESSURE: 132 MMHG

## 2022-12-14 DIAGNOSIS — E78.49 OTHER HYPERLIPIDEMIA: ICD-10-CM

## 2022-12-14 DIAGNOSIS — Z13.1 SCREENING FOR DIABETES MELLITUS (DM): ICD-10-CM

## 2022-12-14 DIAGNOSIS — Z78.0 POSTMENOPAUSAL STATE: ICD-10-CM

## 2022-12-14 DIAGNOSIS — I10 HYPERTENSION, ESSENTIAL: ICD-10-CM

## 2022-12-14 DIAGNOSIS — Z86.718 HISTORY OF DVT OF LOWER EXTREMITY: ICD-10-CM

## 2022-12-14 DIAGNOSIS — Z00.00 MEDICARE ANNUAL WELLNESS VISIT, SUBSEQUENT: Primary | ICD-10-CM

## 2022-12-14 PROBLEM — I25.10 CORONARY ARTERY DISEASE INVOLVING NATIVE CORONARY ARTERY OF NATIVE HEART WITHOUT ANGINA PECTORIS: Status: RESOLVED | Noted: 2019-10-30 | Resolved: 2022-12-14

## 2022-12-14 PROBLEM — I73.9 PERIPHERAL ARTERIAL DISEASE (HCC): Status: RESOLVED | Noted: 2020-11-17 | Resolved: 2022-12-14

## 2022-12-14 NOTE — PATIENT INSTRUCTIONS
SHINGRIX is the new, more effective vaccine for Shingles  It is more than 90% effective  You should check with your local pharmacy and insurance company for availability and coverage  It is a 2 shot series, with the second shot given between 2-6 months after the first, and is approved for ages 48 and up  Medicare Preventive Visit Patient Instructions  Thank you for completing your Welcome to Medicare Visit or Medicare Annual Wellness Visit today  Your next wellness visit will be due in one year (12/15/2023)  The screening/preventive services that you may require over the next 5-10 years are detailed below  Some tests may not apply to you based off risk factors and/or age  Screening tests ordered at today's visit but not completed yet may show as past due  Also, please note that scanned in results may not display below  Preventive Screenings:  Service Recommendations Previous Testing/Comments   Colorectal Cancer Screening  * Colonoscopy    * Fecal Occult Blood Test (FOBT)/Fecal Immunochemical Test (FIT)  * Fecal DNA/Cologuard Test  * Flexible Sigmoidoscopy Age: 39-70 years old   Colonoscopy: every 10 years (may be performed more frequently if at higher risk)  OR  FOBT/FIT: every 1 year  OR  Cologuard: every 3 years  OR  Sigmoidoscopy: every 5 years  Screening may be recommended earlier than age 39 if at higher risk for colorectal cancer  Also, an individualized decision between you and your healthcare provider will decide whether screening between the ages of 74-80 would be appropriate  Colonoscopy: Not on file  FOBT/FIT: Not on file  Cologuard: Not on file  Sigmoidoscopy: Not on file          Breast Cancer Screening Age: 36 years old  Frequency: every 1-2 years  Not required if history of left and right mastectomy Mammogram: 09/02/2015        Cervical Cancer Screening Between the ages of 21-29, pap smear recommended once every 3 years     Between the ages of 33-67, can perform pap smear with HPV co-testing every 5 years  Recommendations may differ for women with a history of total hysterectomy, cervical cancer, or abnormal pap smears in past  Pap Smear: Not on file        Hepatitis C Screening Once for adults born between 1945 and 1965  More frequently in patients at high risk for Hepatitis C Hep C Antibody: Not on file        Diabetes Screening 1-2 times per year if you're at risk for diabetes or have pre-diabetes Fasting glucose: 94 mg/dL (5/1/2020)  A1C: No results in last 5 years (No results in last 5 years)      Cholesterol Screening Once every 5 years if you don't have a lipid disorder  May order more often based on risk factors  Lipid panel: 10/06/2022          Other Preventive Screenings Covered by Medicare:  1  Abdominal Aortic Aneurysm (AAA) Screening: covered once if your at risk  You're considered to be at risk if you have a family history of AAA  2  Lung Cancer Screening: covers low dose CT scan once per year if you meet all of the following conditions: (1) Age 50-69; (2) No signs or symptoms of lung cancer; (3) Current smoker or have quit smoking within the last 15 years; (4) You have a tobacco smoking history of at least 20 pack years (packs per day multiplied by number of years you smoked); (5) You get a written order from a healthcare provider  3  Glaucoma Screening: covered annually if you're considered high risk: (1) You have diabetes OR (2) Family history of glaucoma OR (3)  aged 48 and older OR (3)  American aged 72 and older  3  Osteoporosis Screening: covered every 2 years if you meet one of the following conditions: (1) You're estrogen deficient and at risk for osteoporosis based off medical history and other findings; (2) Have a vertebral abnormality; (3) On glucocorticoid therapy for more than 3 months; (4) Have primary hyperparathyroidism; (5) On osteoporosis medications and need to assess response to drug therapy     · Last bone density test (DXA Scan): Not on file  5  HIV Screening: covered annually if you're between the age of 12-76  Also covered annually if you are younger than 13 and older than 72 with risk factors for HIV infection  For pregnant patients, it is covered up to 3 times per pregnancy  Immunizations:  Immunization Recommendations   Influenza Vaccine Annual influenza vaccination during flu season is recommended for all persons aged >= 6 months who do not have contraindications   Pneumococcal Vaccine   * Pneumococcal conjugate vaccine = PCV13 (Prevnar 13), PCV15 (Vaxneuvance), PCV20 (Prevnar 20)  * Pneumococcal polysaccharide vaccine = PPSV23 (Pneumovax) Adults 25-60 years old: 1-3 doses may be recommended based on certain risk factors  Adults 72 years old: 1-2 doses may be recommended based off what pneumonia vaccine you previously received   Hepatitis B Vaccine 3 dose series if at intermediate or high risk (ex: diabetes, end stage renal disease, liver disease)   Tetanus (Td) Vaccine - COST NOT COVERED BY MEDICARE PART B Following completion of primary series, a booster dose should be given every 10 years to maintain immunity against tetanus  Td may also be given as tetanus wound prophylaxis  Tdap Vaccine - COST NOT COVERED BY MEDICARE PART B Recommended at least once for all adults  For pregnant patients, recommended with each pregnancy  Shingles Vaccine (Shingrix) - COST NOT COVERED BY MEDICARE PART B  2 shot series recommended in those aged 48 and above     Health Maintenance Due:      Topic Date Due   • Hepatitis C Screening  Never done   • Breast Cancer Screening: Mammogram  09/02/2016     Immunizations Due:      Topic Date Due   • Hepatitis B Vaccine (1 of 3 - 3-dose series) Never done   • COVID-19 Vaccine (1) Never done   • Influenza Vaccine (1) 09/01/2022     Advance Directives   What are advance directives? Advance directives are legal documents that state your wishes and plans for medical care   These plans are made ahead of time in case you lose your ability to make decisions for yourself  Advance directives can apply to any medical decision, such as the treatments you want, and if you want to donate organs  What are the types of advance directives? There are many types of advance directives, and each state has rules about how to use them  You may choose a combination of any of the following:  · Living will: This is a written record of the treatment you want  You can also choose which treatments you do not want, which to limit, and which to stop at a certain time  This includes surgery, medicine, IV fluid, and tube feedings  · Durable power of  for healthcare [facility]): This is a written record that states who you want to make healthcare choices for you when you are unable to make them for yourself  This person, called a proxy, is usually a family member or a friend  You may choose more than 1 proxy  · Do not resuscitate (DNR) order:  A DNR order is used in case your heart stops beating or you stop breathing  It is a request not to have certain forms of treatment, such as CPR  A DNR order may be included in other types of advance directives  · Medical directive: This covers the care that you want if you are in a coma, near death, or unable to make decisions for yourself  You can list the treatments you want for each condition  Treatment may include pain medicine, surgery, blood transfusions, dialysis, IV or tube feedings, and a ventilator (breathing machine)  · Values history: This document has questions about your views, beliefs, and how you feel and think about life  This information can help others choose the care that you would choose  Why are advance directives important? An advance directive helps you control your care  Although spoken wishes may be used, it is better to have your wishes written down  Spoken wishes can be misunderstood, or not followed  Treatments may be given even if you do not want them   An advance directive may make it easier for your family to make difficult choices about your care  Weight Management   Why it is important to manage your weight:  Being overweight increases your risk of health conditions such as heart disease, high blood pressure, type 2 diabetes, and certain types of cancer  It can also increase your risk for osteoarthritis, sleep apnea, and other respiratory problems  Aim for a slow, steady weight loss  Even a small amount of weight loss can lower your risk of health problems  How to lose weight safely:  A safe and healthy way to lose weight is to eat fewer calories and get regular exercise  You can lose up about 1 pound a week by decreasing the number of calories you eat by 500 calories each day  Healthy meal plan for weight management:  A healthy meal plan includes a variety of foods, contains fewer calories, and helps you stay healthy  A healthy meal plan includes the following:  · Eat whole-grain foods more often  A healthy meal plan should contain fiber  Fiber is the part of grains, fruits, and vegetables that is not broken down by your body  Whole-grain foods are healthy and provide extra fiber in your diet  Some examples of whole-grain foods are whole-wheat breads and pastas, oatmeal, brown rice, and bulgur  · Eat a variety of vegetables every day  Include dark, leafy greens such as spinach, kale, flower greens, and mustard greens  Eat yellow and orange vegetables such as carrots, sweet potatoes, and winter squash  · Eat a variety of fruits every day  Choose fresh or canned fruit (canned in its own juice or light syrup) instead of juice  Fruit juice has very little or no fiber  · Eat low-fat dairy foods  Drink fat-free (skim) milk or 1% milk  Eat fat-free yogurt and low-fat cottage cheese  Try low-fat cheeses such as mozzarella and other reduced-fat cheeses  · Choose meat and other protein foods that are low in fat    Choose beans or other legumes such as split peas or lentils  Choose fish, skinless poultry (chicken or turkey), or lean cuts of red meat (beef or pork)  Before you cook meat or poultry, cut off any visible fat  · Use less fat and oil  Try baking foods instead of frying them  Add less fat, such as margarine, sour cream, regular salad dressing and mayonnaise to foods  Eat fewer high-fat foods  Some examples of high-fat foods include french fries, doughnuts, ice cream, and cakes  · Eat fewer sweets  Limit foods and drinks that are high in sugar  This includes candy, cookies, regular soda, and sweetened drinks  Exercise:  Exercise at least 30 minutes per day on most days of the week  Some examples of exercise include walking, biking, dancing, and swimming  You can also fit in more physical activity by taking the stairs instead of the elevator or parking farther away from stores  Ask your healthcare provider about the best exercise plan for you  © Copyright [employer] 2018 Information is for End User's use only and may not be sold, redistributed or otherwise used for commercial purposes   All illustrations and images included in CareNotes® are the copyrighted property of A D A M , Inc  or [address] [place], [place] [address]pe

## 2022-12-14 NOTE — PROGRESS NOTES
Assessment/Plan:    No problem-specific Assessment & Plan notes found for this encounter  Advised that, if her cardiologist agrees, she can have me refill her bp and choesterol meds and then can f/u here q6m, otherwise yearly    NOAC per Dr Jenny Stock, long term     Diagnoses and all orders for this visit:    Medicare annual wellness visit, subsequent    History of DVT of lower extremity    Hypertension, essential    Other hyperlipidemia  -     Comprehensive metabolic panel; Future    Postmenopausal state  -     DXA bone density spine hip and pelvis; Future    Screening for diabetes mellitus (DM)  -     Comprehensive metabolic panel; Future        Can take 2w holiday from statin due to myalgias, if persists she can call for PT order  CQ10 option also advised    No follow-ups on file  Subjective:      Patient ID: Sary Baez is a 68 y o  female  Chief Complaint   Patient presents with   • Establish Care     Nm lpn   • Medicare Wellness Visit       HPI    Was seeing Dr Becca Weldon [facility] in [facility], q6m, 14y ago     On statin  Labs reviewed, FLP    Cardiac cath possible mild abnormality, no CAD though    On eliquis for DVT from Dr Jenny Stock  Recurrent DVT  Long term is the plan    Not exercise  Bad diet per pt  Has sweets    bp and chol from Dr Pang Flatten from Dr Jenny Stock    B/l shouder DJD  Does get pain in UE muscles  CK normal recently  Aches occur often  No known triggers    The following portions of the patient's history were reviewed and updated as appropriate: allergies, current medications, past family history, past medical history, past social history, past surgical history and problem list     Review of Systems   Constitutional: Negative for chills and fever           Current Outpatient Medications   Medication Sig Dispense Refill   • apixaban (Eliquis) 5 mg Take 1 tablet (5 mg total) by mouth 2 (two) times a day 60 tablet 5   • Calcium Carbonate-Vit D-Min (CALCIUM 1200 PO) Take by mouth     • Cholecalciferol (VITAMIN D3) 2000 units TABS Take 2,000 Units by mouth daily     • co-enzyme Q-10 30 MG capsule Take 30 mg by mouth 3 (three) times a day     • lisinopril-hydrochlorothiazide (PRINZIDE,ZESTORETIC) 20-12 5 MG per tablet Take by mouth     • metoprolol succinate (TOPROL-XL) 50 mg 24 hr tablet Take 50 mg by mouth daily     • multivitamin (THERAGRAN) TABS Take 1 tablet by mouth daily     • simvastatin (ZOCOR) 20 mg tablet Take 20 mg by mouth every evening       No current facility-administered medications for this visit  Objective:    /84   Pulse 59   Temp (!) 97 2 °F (36 2 °C)   Resp 18   Ht 5' 4" (1 626 m)   Wt 87 9 kg (193 lb 12 8 oz)   BMI 33 27 kg/m²        Physical Exam  Constitutional:       General: She is not in acute distress  Appearance: She is obese  HENT:      Nose: No congestion  Eyes:      General: No scleral icterus  Neck:      Vascular: No carotid bruit  Cardiovascular:      Rate and Rhythm: Normal rate and regular rhythm  Heart sounds: No murmur heard  Pulmonary:      Effort: No respiratory distress  Breath sounds: No wheezing  Skin:     Coloration: Skin is not pale     Psychiatric:         Mood and Affect: Mood normal                 Celi Sweet, DO

## 2022-12-15 NOTE — PROGRESS NOTES
Assessment and Plan:     Problem List Items Addressed This Visit        Active Problems    History of DVT of lower extremity    Hypertension, essential    Other hyperlipidemia    Relevant Orders    Comprehensive metabolic panel    Postmenopausal state    Relevant Orders    DXA bone density spine hip and pelvis    Screening for diabetes mellitus (DM)    Relevant Orders    Comprehensive metabolic panel    Medicare annual wellness visit, subsequent - Primary        Preventive health issues were discussed with patient, and age appropriate screening tests were ordered as noted in patient's After Visit Summary  Personalized health advice and appropriate referrals for health education or preventive services given if needed, as noted in patient's After Visit Summary  History of Present Illness:     Patient presents for a Medicare Wellness Visit    HPI   Patient Care Team:  Swati Ritter DO as PCP - General (Family Medicine)     Review of Systems:     Review of Systems     Problem List:     Patient Active Problem List   Diagnosis   • Gastroesophageal reflux disease   • History of DVT of lower extremity   • Hypertension, essential   • Interatrial septal aneurysm with PFO   • Primary osteoarthritis of right knee   • Microscopic hematuria   • Mononeuritis   • Seasonal allergies   • Unruptured cyst of right popliteal space   • Varicose veins with swelling   • Vitamin D deficiency   • Other hyperlipidemia   • Postmenopausal state   • Screening for diabetes mellitus (DM)   • Medicare annual wellness visit, subsequent      Past Medical and Surgical History:     Past Medical History:   Diagnosis Date   • DVT (deep venous thrombosis) ([address] )    • Hyperlipidemia    • Hypertension      History reviewed  No pertinent surgical history     Family History:     Family History   Problem Relation Age of Onset   • Colon cancer Father    • Heart disease Maternal Grandmother       Social History:     Social History     Socioeconomic History   • Marital status: /Civil Union     Spouse name: None   • Number of children: None   • Years of education: None   • Highest education level: None   Occupational History   • None   Tobacco Use   • Smoking status: Never   • Smokeless tobacco: Never   Vaping Use   • Vaping Use: Never used   Substance and Sexual Activity   • Alcohol use: Yes     Alcohol/week: 1 0 standard drink     Types: 1 Glasses of wine per week   • Drug use: Never   • Sexual activity: None   Other Topics Concern   • None   Social History Narrative   • None     Social Determinants of Health     Financial Resource Strain: Low Risk    • Difficulty of Paying Living Expenses: Not very hard   Food Insecurity: Not on file   Transportation Needs: No Transportation Needs   • Lack of Transportation (Medical): No   • Lack of Transportation (Non-Medical): No   Physical Activity: Not on file   Stress: Not on file   Social Connections: Not on file   Intimate Partner Violence: Not on file   Housing Stability: Not on file      Medications and Allergies:     Current Outpatient Medications   Medication Sig Dispense Refill   • apixaban (Eliquis) 5 mg Take 1 tablet (5 mg total) by mouth 2 (two) times a day 60 tablet 5   • Calcium Carbonate-Vit D-Min (CALCIUM 1200 PO) Take by mouth     • Cholecalciferol (VITAMIN D3) 2000 units TABS Take 2,000 Units by mouth daily     • co-enzyme Q-10 30 MG capsule Take 30 mg by mouth 3 (three) times a day     • lisinopril-hydrochlorothiazide (PRINZIDE,ZESTORETIC) 20-12 5 MG per tablet Take by mouth     • metoprolol succinate (TOPROL-XL) 50 mg 24 hr tablet Take 50 mg by mouth daily     • multivitamin (THERAGRAN) TABS Take 1 tablet by mouth daily     • simvastatin (ZOCOR) 20 mg tablet Take 20 mg by mouth every evening       No current facility-administered medications for this visit       Allergies   Allergen Reactions   • Penicillins Hives      Immunizations:     Immunization History   Administered Date(s) Administered   • INFLUENZA 10/31/2017, 10/02/2020   • Influenza Split High Dose Preservative Free IM 09/13/2019, 10/01/2019   • Influenza, recombinant, quadrivalent,injectable, preservative free 10/02/2020   • Pneumococcal Conjugate 13-Valent 06/30/2016, 10/02/2020   • Pneumococcal Polysaccharide PPV23 10/02/2020   • Tdap 10/31/2017   • Zoster 01/01/2016   • influenza, trivalent, adjuvanted 10/02/2020      Health Maintenance:         Topic Date Due   • Hepatitis C Screening  Never done   • Breast Cancer Screening: Mammogram  09/02/2016         Topic Date Due   • Hepatitis B Vaccine (1 of 3 - 3-dose series) Never done   • COVID-19 Vaccine (1) Never done   • Influenza Vaccine (1) 09/01/2022      Medicare Screening Tests and Risk Assessments:     Bertah Chirinos is here for her Subsequent Wellness visit  Last Medicare Wellness visit information reviewed, patient interviewed and updates made to the record today  Health Risk Assessment:   Patient rates overall health as good  Patient feels that their physical health rating is same  Patient is satisfied with their life  Eyesight was rated as same  Hearing was rated as same  Patient feels that their emotional and mental health rating is same  Patients states they are sometimes angry  Patient states they are sometimes unusually tired/fatigued  Pain experienced in the last 7 days has been a lot  Patient's pain rating has been 7/10  Patient states that she has experienced no weight loss or gain in last 6 months  Depression Screening:   PHQ-2 Score: 0      Fall Risk Screening: In the past year, patient has experienced: no history of falling in past year      Urinary Incontinence Screening:   Patient has not leaked urine accidently in the last six months  Home Safety:  Patient does not have trouble with stairs inside or outside of their home  Patient has working smoke alarms and has working carbon monoxide detector  Home safety hazards include: none       Nutrition:   Current diet is Regular  Medications:   Patient is currently taking over-the-counter supplements  OTC medications include: see medication list  Patient is able to manage medications  Activities of Daily Living (ADLs)/Instrumental Activities of Daily Living (IADLs):   Walk and transfer into and out of bed and chair?: Yes  Dress and groom yourself?: Yes    Bathe or shower yourself?: Yes    Feed yourself? Yes  Do your laundry/housekeeping?: Yes  Manage your money, pay your bills and track your expenses?: Yes  Make your own meals?: Yes    Do your own shopping?: Yes    Previous Hospitalizations:   Any hospitalizations or ED visits within the last 12 months?: No      Advance Care Planning:   Living will: No    Durable POA for healthcare: No    End of Life Decisions reviewed with patient: No      Cognitive Screening:   Provider or family/friend/caregiver concerned regarding cognition?: No    PREVENTIVE SCREENINGS      Cardiovascular Screening:    General: Screening Not Indicated and History Lipid Disorder      Diabetes Screening:     General: Risks and Benefits Discussed      Colorectal Cancer Screening:     General: Screening Not Indicated      Breast Cancer Screening:     General: Screening Not Indicated      Cervical Cancer Screening:    General: Screening Not Indicated      Osteoporosis Screening:    General: Risks and Benefits Discussed      Abdominal Aortic Aneurysm (AAA) Screening:        General: Screening Not Indicated      Lung Cancer Screening:     General: Screening Not Indicated      Hepatitis C Screening:    General: Patient Declines    Hep C Screening Accepted: No     Screening, Brief Intervention, and Referral to Treatment (SBIRT)    Screening  Typical number of drinks in a day: 1  Typical number of drinks in a week: 2  Interpretation: Low risk drinking behavior      Single Item Drug Screening:  How often have you used an illegal drug (including marijuana) or a prescription medication for non-medical reasons in the past year? never    Single Item Drug Screen Score: 0  Interpretation: Negative screen for possible drug use disorder    Other Counseling Topics:   Car/seat belt/driving safety and regular weightbearing exercise and calcium and vitamin D intake  No results found       Physical Exam:     /84   Pulse 59   Temp (!) 97 2 °F (36 2 °C)   Resp 18   Ht 5' 4" (1 626 m)   Wt 87 9 kg (193 lb 12 8 oz)   BMI 33 27 kg/m²     Physical Exam     Smita White DO

## 2022-12-21 ENCOUNTER — HOSPITAL ENCOUNTER (OUTPATIENT)
Dept: RADIOLOGY | Facility: HOSPITAL | Age: 77
Discharge: HOME | End: 2022-12-21
Attending: FAMILY MEDICINE

## 2022-12-21 VITALS — WEIGHT: 193 LBS | BODY MASS INDEX: 32.95 KG/M2 | HEIGHT: 64 IN

## 2022-12-21 DIAGNOSIS — Z78.0 POSTMENOPAUSAL STATE: ICD-10-CM

## 2022-12-21 PROBLEM — M85.851 OSTEOPENIA OF NECKS OF BOTH FEMURS: Status: ACTIVE | Noted: 2022-12-21

## 2022-12-21 PROBLEM — M85.852 OSTEOPENIA OF NECKS OF BOTH FEMURS: Status: ACTIVE | Noted: 2022-12-21

## 2023-02-12 PROBLEM — Z13.1 SCREENING FOR DIABETES MELLITUS (DM): Status: RESOLVED | Noted: 2022-12-14 | Resolved: 2023-02-12

## 2023-02-12 PROBLEM — Z00.00 MEDICARE ANNUAL WELLNESS VISIT, SUBSEQUENT: Status: RESOLVED | Noted: 2022-12-14 | Resolved: 2023-02-12

## 2023-05-01 ENCOUNTER — TELEPHONE (OUTPATIENT)
Dept: HEMATOLOGY ONCOLOGY | Facility: CLINIC | Age: 78
End: 2023-05-01

## 2023-05-01 DIAGNOSIS — I82.4Z1 ACUTE DEEP VEIN THROMBOSIS (DVT) OF DISTAL VEIN OF RIGHT LOWER EXTREMITY (HCC): ICD-10-CM

## 2023-05-01 NOTE — TELEPHONE ENCOUNTER
Medication Refill Request   Who are you speaking with? Patient   If it is not the patient, are they listed on an active communication consent form? N/A   Which medication is being requested for refill? Please list medication name and dosage eliquis 5 mg   How many pills does the patient have left? Patient states she has enough for two weeks or so  Preferred Pharmacy / [facility] [address], [address] [place] [address]    Who is the prescribing provider?  Dr Venus Martínez   Call back number [phone]   Relevant Information  N/A

## 2023-05-08 ENCOUNTER — TELEPHONE (OUTPATIENT)
Dept: HEMATOLOGY ONCOLOGY | Facility: CLINIC | Age: 78
End: 2023-05-08

## 2023-05-08 NOTE — TELEPHONE ENCOUNTER
Appointment Change  Cancel, Reschedule, Change to Virtual      Who are you speaking with? Patient   If it is not the patient, are they listed on an active communication consent form? N/A   Which provider is the appointment scheduled with? Dr Hamzah Jacob   When is the appointment scheduled? Please list date and time 6/12/23   At which location is the appointment scheduled to take place? [facility]   Was the appointment rescheduled or changed from an in person visit to a virtual visit? If so, please list the details of the change  6/27/23  [facility]    What is the reason for the appointment change? Provider on vacation   Was [facility] transport scheduled for this visit? No   Does [facility] transport need to be scheduled for the new visit (if applicable) No   Does the patient need an infusion appointment rescheduled? No   Does the patient have an infusion appointment scheduled? If so, when? No   Is the patient undergoing chemotherapy? No   Was the no-show policy reviewed for appointments being changed with less then 24 hours of notice?  Yes

## 2023-08-02 ENCOUNTER — TELEPHONE (OUTPATIENT)
Dept: HEMATOLOGY ONCOLOGY | Facility: MEDICAL CENTER | Age: 78
End: 2023-08-02

## 2023-08-02 ENCOUNTER — TELEPHONE (OUTPATIENT)
Dept: HEMATOLOGY ONCOLOGY | Facility: CLINIC | Age: 78
End: 2023-08-02

## 2023-08-02 NOTE — TELEPHONE ENCOUNTER
Advised patient to be seen by PCP  Patient states she doesn't have a PCP  Advised patient to be seen at an Urgent care  Patient will be examined  Patient also states that office is difficult to reach via phone  Offered my TEAMs number for direct contact  Patient declined

## 2023-08-02 NOTE — TELEPHONE ENCOUNTER
Patient Call    Who are you speaking with? self   If it is not the patient, are they listed on an active communication consent form? yes   What is the reason for this call? On legs has various marks on both legs calf. Related to eliquis? Does this require a call back? yes   If a call back is required, please list best call back number [phone]   If a call back is required, advise that a message will be forwarded to their care team and someone will return their call as soon as possible. Did you relay this information to the patient?  yes

## 2023-08-29 ENCOUNTER — OFFICE VISIT (OUTPATIENT)
Dept: HEMATOLOGY ONCOLOGY | Facility: CLINIC | Age: 78
End: 2023-08-29
Payer: MEDICARE

## 2023-08-29 VITALS
OXYGEN SATURATION: 96 % | HEIGHT: 64 IN | BODY MASS INDEX: 34.15 KG/M2 | TEMPERATURE: 97.1 F | DIASTOLIC BLOOD PRESSURE: 86 MMHG | RESPIRATION RATE: 18 BRPM | WEIGHT: 200 LBS | HEART RATE: 63 BPM | SYSTOLIC BLOOD PRESSURE: 124 MMHG

## 2023-08-29 DIAGNOSIS — Z86.718 HISTORY OF DVT OF LOWER EXTREMITY: Primary | ICD-10-CM

## 2023-08-29 PROCEDURE — 99214 OFFICE O/P EST MOD 30 MIN: CPT | Performed by: INTERNAL MEDICINE

## 2023-08-29 NOTE — PROGRESS NOTES
Rogersvidal Freeman  [birth year]  [facility] [address] [facility] [id number]    DISCUSSION/SUMMARY:    66year-old female previously with a right lower extremity (soleal [distal]) DVT). Just previous to her symptoms, patient had taken a long car ride to [place]. This was likely a provoked incident. Mrs. Ana Lilia Carroll completed 3+ months of anticoagulation treatment. Unfortunately, more recently patient developed another left lower extremity DVT. No clear provoking incident at that time. Eliquis was restarted. Mrs. Ana Lilia Carroll continues to feel well and clinically there are no concerning signs. The plan is to continue the Eliquis 5 mg twice a day. Patient knows to call the office if she is scheduled for any invasive procedure or surgery. Patient will also monitor for any lower extremity swelling, cords, pain or any acute respiratory issues. As discussed previously, because Mrs. Ana Lilia Carroll needs anticoagulation long-term, I do not believe that a thrombophilia evaluation is needed. There is no family history of clots; patient has 1 son who is morbidly obese and had 1 DVT previously. No grandchildren. We discussed Eliquis manipulation, going to 2.5 mg twice a day. Because patient has not had any bruising/bleeding issues and has tolerated the Eliquis otherwise, patient wishes to keep the dose at 5 mg twice a day. We also talked about follow-ups. Patient agrees to 1 more hematology follow-up in 6 months. At that time, if no issues, patient and her PCP can monitor the Eliquis, side effects etc.    Carefully review your medication list and verify that the list is accurate and up-to-date.  Please call the hematology/oncology office if there are medications missing from the list, medications on the list that you are not currently taking or if there is a dosage or instruction that is different from how you're taking that medication. Patient goals and areas of care:  Continue with the Eliquis  Barriers to care:  None  Patient is able to self-care.  ___________________________________________________________________________________________________    Chief Complaint   Patient presents with   • Follow-up     Acute deep vein thrombosis (DVT) of distal vein of right lower extremity      History of Present Illness:    77-year-old female previously referred for evaluation of a right lower extremity DVT (long car ride). Mrs. Jacoby Goddard completed 3+ months of anticoagulation treatment. Subsequently patient noticed left lower extremity swelling and tenderness in the calf region. Patient also noticed mild difficulty getting up the steps in her house. Patient was seen by her PCP and a Doppler was ordered. Patient was found to have a new left lower extremity DVT. Mrs. Jacoby Goddard was restarted on Eliquis. Patient noted that the mild pulmonary symptoms went away within a few days of being on the Eliquis. There was no precipitating event for the 2nd DVT. Patient on long-term Eliquis and returns for follow-up. Patient was diagnosed with COVID in the fall 2021 - no serious complications from the infection. Mrs. Jacoby Goddard states feeling well for the most part. No lower extremity swelling or pain. No respiratory issues. No problems with excessive bruising or bleeding, no other problems with the Eliquis. As discussed previously, patient was infected with COVID last year; uncomplicated - subsequently received the vaccine. Review of Systems   Constitutional: Negative. HENT: Negative. Eyes: Negative. Respiratory: Negative. Cardiovascular: Negative for leg swelling. Gastrointestinal: Negative. Endocrine: Negative. Genitourinary: Negative. Musculoskeletal: Negative. Skin: Negative. Allergic/Immunologic: Negative. Neurological: Negative. Hematological: Negative. Psychiatric/Behavioral: Negative. All other systems reviewed and are negative. Patient Active Problem List   Diagnosis   • Gastroesophageal reflux disease   • History of DVT of lower extremity   • Hypertension, essential   • Interatrial septal aneurysm with PFO   • Primary osteoarthritis of right knee   • Microscopic hematuria   • Mononeuritis   • Seasonal allergies   • Unruptured cyst of right popliteal space   • Varicose veins with swelling   • Vitamin D deficiency   • Other hyperlipidemia   • Postmenopausal state   • Osteopenia of necks of both femurs     Past Medical History:   Diagnosis Date   • DVT (deep venous thrombosis) ([address])    • Hyperlipidemia    • Hypertension     Past medical history:  As above, normal childhood illnesses and vaccinations    Ob/gyn:  Mammograms up-to-date and within normal limits, no postmenopausal bleeding    Past surgical history:  None, no blood transfusions    No past surgical history on file. Family History   Problem Relation Age of Onset   • Colon cancer Father    • Heart disease Maternal Grandmother     Family history:  3 children, 2 in general good health, 3rd child with significant morbid obesity with PE in the past presently on Eliquis no other known familial or genetic diseases    Social History     Socioeconomic History   • Marital status: /Civil Union     Spouse name: Not on file   • Number of children: Not on file   • Years of education: Not on file   • Highest education level: Not on file   Occupational History   • Not on file   Tobacco Use   • Smoking status: Never   • Smokeless tobacco: Never   Vaping Use   • Vaping Use: Never used   Substance and Sexual Activity   • Alcohol use:  Yes     Alcohol/week: 1.0 standard drink of alcohol     Types: 1 Glasses of wine per week   • Drug use: Never   • Sexual activity: Not on file   Other Topics Concern   • Not on file   Social History Narrative   • Not on file     Social Determinants of Health     Financial Resource Strain: Low Risk  (12/14/2022) Overall Financial Resource Strain (CARDIA)    • Difficulty of Paying Living Expenses: Not very hard   Food Insecurity: Not on file   Transportation Needs: No Transportation Needs (12/14/2022)    PRAPARE - Transportation    • Lack of Transportation (Medical): No    • Lack of Transportation (Non-Medical): No   Physical Activity: Not on file   Stress: Not on file   Social Connections: Not on file   Intimate Partner Violence: Not on file   Housing Stability: Not on file    Social history:  No tobacco, alcohol or drug abuse, no known toxic exposure,     Current Outpatient Medications:   •  apixaban (Eliquis) 5 mg, Take 1 tablet (5 mg total) by mouth 2 (two) times a day, Disp: 60 tablet, Rfl: 5  •  Calcium Carbonate-Vit D-Min (CALCIUM 1200 PO), Take by mouth, Disp: , Rfl:   •  Cholecalciferol (VITAMIN D3) 2000 units TABS, Take 2,000 Units by mouth daily, Disp: , Rfl:   •  co-enzyme Q-10 30 MG capsule, Take 30 mg by mouth 3 (three) times a day, Disp: , Rfl:   •  lisinopril-hydrochlorothiazide (PRINZIDE,ZESTORETIC) 20-12.5 MG per tablet, Take by mouth, Disp: , Rfl:   •  metoprolol succinate (TOPROL-XL) 50 mg 24 hr tablet, Take 50 mg by mouth daily, Disp: , Rfl:   •  multivitamin (THERAGRAN) TABS, Take 1 tablet by mouth daily, Disp: , Rfl:   •  simvastatin (ZOCOR) 20 mg tablet, Take 20 mg by mouth every evening, Disp: , Rfl:     Allergies   Allergen Reactions   • Penicillins Hives       Vitals:    08/29/23 1539   BP: 124/86   Pulse: 63   Resp: 18   Temp: (!) 97.1 °F (36.2 °C)   SpO2: 96%     Physical Exam  Constitutional:       Appearance: She is well-developed. Comments: Well-nourished female, no respiratory distress   HENT:      Head: Normocephalic and atraumatic. Right Ear: External ear normal.      Left Ear: External ear normal.   Eyes:      Conjunctiva/sclera: Conjunctivae normal.      Pupils: Pupils are equal, round, and reactive to light.    Neck:      Comments: Supple, no JVD  Cardiovascular: Rate and Rhythm: Normal rate and regular rhythm. Heart sounds: Normal heart sounds. Pulmonary:      Effort: Pulmonary effort is normal.      Breath sounds: Normal breath sounds. Abdominal:      General: Bowel sounds are normal.      Palpations: Abdomen is soft. Comments: Soft, nontender, +bowel sounds, no rigidity rebound, cannot palpate liver or spleen   Musculoskeletal:         General: Normal range of motion. Cervical back: Normal range of motion and neck supple. Skin:     General: Skin is warm. Comments: Warm, moist, good color, no petechiae or ecchymoses   Neurological:      Mental Status: She is alert and oriented to person, place, and time. Deep Tendon Reflexes: Reflexes are normal and symmetric. Psychiatric:         Behavior: Behavior normal.         Thought Content: Thought content normal.         Judgment: Judgment normal.     Extremities:  No lower extremity edema bilaterally, no cords, +bilateral lower extremity varicose veins - same as before -no superficial cords, pulses are 1+  Lymphatics:  No adenopathy in the neck, supraclavicular region, axilla and groin bilaterally    Labs    6/6/2023 WBC = 5.2 hemoglobin = 12.4 hematocrit = 36 platelet = 014 BUN = 19 creatinine = 0.99 calcium = 9.8 LFTs WNL    Imaging    03/13/2019 right lower extremity Doppler from [facility] conclusion stated that compared to the study from February 11, 2019, the soleal vein DVT had resolved and there was no evidence of DVT in any other right lower extremity superficial or deep vein    02/11/2019 ([facility]) bilateral lower extremity venous duplex study conclusion stated acute occlusive DVT of the soleal vein from the distal to mid calf on the right. All other right lower extremity vessels were negative for DVT and SVT. No DVT or SVT in the left lower extremity.

## 2023-10-30 ENCOUNTER — TELEPHONE (OUTPATIENT)
Dept: HEMATOLOGY ONCOLOGY | Facility: CLINIC | Age: 78
End: 2023-10-30

## 2023-10-30 DIAGNOSIS — I82.4Z1 ACUTE DEEP VEIN THROMBOSIS (DVT) OF DISTAL VEIN OF RIGHT LOWER EXTREMITY (HCC): ICD-10-CM

## 2023-10-30 NOTE — TELEPHONE ENCOUNTER
Medication Refill Request   Who are you speaking with? Patient   If it is not the patient, are they listed on an active communication consent form? Yes   Which medication is being requested for refill? Please list medication name and dosage apixaban (Eliquis) 5 mg    How many pills does the patient have left? 5   Preferred Pharmacy / [address] [place] [address], [address] [place] [address]  Phone: [phone]  Fax: [phone]    Who is the prescribing provider?  Dr. Bambi Albarran   Call back number [phone]    Relevant Information Refill 6 months

## 2024-03-01 ENCOUNTER — OFFICE VISIT (OUTPATIENT)
Dept: HEMATOLOGY ONCOLOGY | Facility: MEDICAL CENTER | Age: 79
End: 2024-03-01
Payer: MEDICARE

## 2024-03-01 VITALS
SYSTOLIC BLOOD PRESSURE: 136 MMHG | DIASTOLIC BLOOD PRESSURE: 74 MMHG | RESPIRATION RATE: 17 BRPM | WEIGHT: 201 LBS | BODY MASS INDEX: 34.31 KG/M2 | HEIGHT: 64 IN | TEMPERATURE: 98 F | HEART RATE: 62 BPM | OXYGEN SATURATION: 96 %

## 2024-03-01 DIAGNOSIS — Z86.718 HISTORY OF DVT OF LOWER EXTREMITY: Primary | ICD-10-CM

## 2024-03-01 PROCEDURE — 99214 OFFICE O/P EST MOD 30 MIN: CPT | Performed by: INTERNAL MEDICINE

## 2024-03-01 RX ORDER — TRIAMCINOLONE ACETONIDE 40 MG/ML
INJECTION, SUSPENSION INTRA-ARTICULAR; INTRAMUSCULAR
COMMUNITY
Start: 2023-11-06

## 2024-03-01 NOTE — PROGRESS NOTES
Jagruti Rodriguez  1945  [facility] [place]  [address] [place] [id number]    DISCUSSION/SUMMARY:    79 year-old female previously with a right lower extremity (soleal [distal]) DVT).  Just previous to her symptoms, patient had taken a long car ride to [place].  This was likely a provoked incident. Mrs. Rodriguez completed 3+ months of anticoagulation treatment.  Unfortunately, more recently patient developed another left lower extremity DVT.  No clear provoking incident at that time.  Eliquis was restarted.  Mrs. Rodriguez feels well and clinically there are no concerning hematology findings.  The plan is to continue with the Eliquis 5 mg twice a day.    Patient may need knee surgery (Mrs. Rodriguez is trying to delay any surgery for as long as possible) the future.  If so, patient should discontinue the Eliquis 48 hours prior to the surgery.  If no surgical complications or excessive bleeding, the Eliquis can be restarted the evening of the procedure.  If the surgeon prefers, the Eliquis can be restarted the next morning.  Additionally, patient should also be evaluated by IR for possible temporary IVC filter placement preoperatively.    As discussed previously, because Mrs. Rodriguez needs anticoagulation long-term, I do not believe that a thrombophilia evaluation is needed.  There is no family history of clots; patient has 1 son who is morbidly obese and had 1 DVT previously.  No grandchildren.      We we discussed follow-ups.  Patient is comfortable if she continues now with her PCP monitoring the Eliquis.  As of now, return to hematology is on an as-needed basis.  Patient knows to call if she has any other questions or concerns.    Carefully review your medication list and verify that the list is accurate and up-to-date. Please call the hematology/oncology office if there are medications missing from the list, medications on the  list that you are not currently taking or if there is a dosage or instruction that is different from how you're taking that medication.    Patient goals and areas of care:  Continue with the Eliquis follow-up with PCP  Barriers to care:  None  Patient is able to self-care  ___________________________________________________________________________________________________    Chief Complaint   Patient presents with    Follow-up    Multiple lower extremity DVTs     History of Present Illness:    79-year-old female previously referred for evaluation of a right lower extremity DVT (long car ride).  Mrs. Rodriguez completed 3+ months of anticoagulation treatment (anticoagulation was then discontinued).  Subsequently patient noticed left lower extremity swelling and tenderness in the calf region.  Patient also noticed mild difficulty getting up the steps in her house.  Patient was seen by her PCP and a Doppler was ordered.  Patient was found to have a new left lower extremity DVT.  Mrs. Rodriguez was restarted on Eliquis.  Patient noted that her mild pulmonary symptoms went away within a few days of being on the Eliquis.  There was no precipitating event for the 2nd DVT.  Patient is on long-term Eliquis and returns for follow-up.    Patient was diagnosed with COVID in the fall 2021 - no serious complications from the infection.    Mrs. Rodriguez states feeling well from a hematology standpoint.  Patient has generalized body aches, has received injections in her shoulders, has significant left knee pain.  Patient is being followed by orthopedics.  Patient also with pain in her right first 2 toes.  No problems with excessive bruising or bleeding.  No other problems with the Eliquis.  No respiratory issues.  Activities are the same as before.  Appetite is good, weight is stable.  No other GI,  or GYN problems.    Review of Systems   Constitutional: Negative.    HENT: Negative.     Eyes: Negative.    Respiratory: Negative.      Cardiovascular:  Negative for leg swelling.   Gastrointestinal: Negative.    Endocrine: Negative.    Genitourinary: Negative.    Musculoskeletal:  Positive for arthralgias.   Skin: Negative.    Allergic/Immunologic: Negative.    Neurological: Negative.    Hematological: Negative.    Psychiatric/Behavioral: Negative.     All other systems reviewed and are negative.     Patient Active Problem List   Diagnosis    Gastroesophageal reflux disease    History of DVT of lower extremity    Hypertension, essential    Interatrial septal aneurysm with PFO    Primary osteoarthritis of right knee    Microscopic hematuria    Mononeuritis    Seasonal allergies    Unruptured cyst of right popliteal space    Varicose veins with swelling    Vitamin D deficiency    Other hyperlipidemia    Postmenopausal state    Osteopenia of necks of both femurs     Past Medical History:   Diagnosis Date    DVT (deep venous thrombosis) ([facility])     Hyperlipidemia     Hypertension    Past medical history:  As above, normal childhood illnesses and vaccinations    Ob/gyn:  Mammograms up-to-date and within normal limits, no postmenopausal bleeding    Past surgical history:  None, no blood transfusions    No past surgical history on file.  Family History   Problem Relation Age of Onset    Colon cancer Father     Heart disease Maternal Grandmother     Family history:  3 children, 2 in general good health, 3rd child with significant morbid obesity with PE in the past presently on Eliquis no other known familial or genetic diseases    Social History     Socioeconomic History    Marital status: /Civil Union     Spouse name: Not on file    Number of children: Not on file    Years of education: Not on file    Highest education level: Not on file   Occupational History    Not on file   Tobacco Use    Smoking status: Never    Smokeless tobacco: Never   Vaping Use    Vaping status: Never Used   Substance and Sexual Activity    Alcohol use: Yes      Alcohol/week: 1.0 standard drink of alcohol     Types: 1 Glasses of wine per week    Drug use: Never    Sexual activity: Not on file   Other Topics Concern    Not on file   Social History Narrative    Not on file     Social Determinants of Health     Financial Resource Strain: Low Risk  (12/14/2022)    Overall Financial Resource Strain (CARDIA)     Difficulty of Paying Living Expenses: Not very hard   Food Insecurity: Not on file   Transportation Needs: No Transportation Needs (12/14/2022)    PRAPARE - Transportation     Lack of Transportation (Medical): No     Lack of Transportation (Non-Medical): No   Physical Activity: Not on file   Stress: Not on file   Social Connections: Not on file   Intimate Partner Violence: Not on file   Housing Stability: Not on file    Social history:  No tobacco, alcohol or drug abuse, no known toxic exposure,     Current Outpatient Medications:     apixaban (Eliquis) 5 mg, Take 1 tablet (5 mg total) by mouth 2 (two) times a day, Disp: 60 tablet, Rfl: 5    Calcium Carbonate-Vit D-Min (CALCIUM 1200 PO), Take by mouth, Disp: , Rfl:     Cholecalciferol (VITAMIN D3) 2000 units TABS, Take 2,000 Units by mouth daily, Disp: , Rfl:     co-enzyme Q-10 30 MG capsule, Take 30 mg by mouth 3 (three) times a day, Disp: , Rfl:     lisinopril-hydrochlorothiazide (PRINZIDE,ZESTORETIC) 20-12.5 MG per tablet, Take by mouth, Disp: , Rfl:     metoprolol succinate (TOPROL-XL) 50 mg 24 hr tablet, Take 50 mg by mouth daily, Disp: , Rfl:     multivitamin (THERAGRAN) TABS, Take 1 tablet by mouth daily, Disp: , Rfl:     simvastatin (ZOCOR) 20 mg tablet, Take 20 mg by mouth every evening, Disp: , Rfl:     triamcinolone acetonide (KENALOG-40) 40 mg/mL, Inject into a muscle, Disp: , Rfl:     Allergies   Allergen Reactions    Penicillins Hives       Vitals:    03/01/24 1045   BP: 136/74   Pulse: 62   Resp: 17   Temp: 98 °F (36.7 °C)   SpO2: 96%     Physical Exam  Constitutional:       Appearance: She is  well-developed.      Comments: Well-nourished female, no respiratory distress   HENT:      Head: Normocephalic and atraumatic.      Right Ear: External ear normal.      Left Ear: External ear normal.   Eyes:      Conjunctiva/sclera: Conjunctivae normal.      Pupils: Pupils are equal, round, and reactive to light.   Neck:      Comments: Supple, no JVD  Cardiovascular:      Rate and Rhythm: Normal rate and regular rhythm.      Heart sounds: Normal heart sounds.   Pulmonary:      Effort: Pulmonary effort is normal.      Breath sounds: Normal breath sounds.   Abdominal:      General: Bowel sounds are normal.      Palpations: Abdomen is soft.      Comments: Soft, nontender, +bowel sounds, no rigidity rebound, cannot palpate liver or spleen   Musculoskeletal:         General: Normal range of motion.      Cervical back: Normal range of motion and neck supple.   Skin:     General: Skin is warm.      Comments: Warm, moist, good color, no petechiae or ecchymoses   Neurological:      Mental Status: She is alert and oriented to person, place, and time.      Deep Tendon Reflexes: Reflexes are normal and symmetric.   Psychiatric:         Behavior: Behavior normal.         Thought Content: Thought content normal.         Judgment: Judgment normal.     Extremities:  No lower extremity edema bilaterally, no cords, +bilateral lower extremity varicose veins - same as before -no superficial cords, pulses are 1+  Lymphatics:  No adenopathy in the neck, supraclavicular region, axilla and groin bilaterally    Labs    6/6/2023 WBC = 5.2 hemoglobin = 12.4 hematocrit = 36 platelet = 244 BUN = 19 creatinine = 0.99 calcium = 9.8 LFTs WNL    Imaging    03/13/2019 right lower extremity Doppler from  conclusion stated that compared to the study from February 11, 2019, the soleal vein DVT had resolved and there was no evidence of DVT in any other right lower extremity superficial or deep vein    02/11/2019 () bilateral lower extremity venous  duplex study conclusion stated acute occlusive DVT of the soleal vein from the distal to mid calf on the right.  All other right lower extremity vessels were negative for DVT and SVT.  No DVT or SVT in the left lower extremity.

## 2024-06-19 ENCOUNTER — TELEPHONE (OUTPATIENT)
Dept: GASTROENTEROLOGY | Facility: CLINIC | Age: 79
End: 2024-06-19

## 2024-06-19 NOTE — TELEPHONE ENCOUNTER
Office visit to discuss colon screening-5 yr colon recall. Hist polyps, family hist colon cancer in father (Dr Gonzales). Called and spoke to pt and scheduled appt.

## 2024-09-23 ENCOUNTER — TELEPHONE (OUTPATIENT)
Dept: GASTROENTEROLOGY | Facility: AMBULARY SURGERY CENTER | Age: 79
End: 2024-09-23

## 2024-09-23 ENCOUNTER — OFFICE VISIT (OUTPATIENT)
Dept: GASTROENTEROLOGY | Facility: AMBULARY SURGERY CENTER | Age: 79
End: 2024-09-23
Payer: MEDICARE

## 2024-09-23 VITALS
HEART RATE: 76 BPM | HEIGHT: 64 IN | OXYGEN SATURATION: 97 % | DIASTOLIC BLOOD PRESSURE: 88 MMHG | BODY MASS INDEX: 33.84 KG/M2 | SYSTOLIC BLOOD PRESSURE: 148 MMHG | WEIGHT: 198.2 LBS

## 2024-09-23 DIAGNOSIS — Z80.0 FAMILY HISTORY OF COLON CANCER: ICD-10-CM

## 2024-09-23 DIAGNOSIS — Z86.0100 HISTORY OF COLON POLYPS: Primary | ICD-10-CM

## 2024-09-23 DIAGNOSIS — Z86.010 HISTORY OF COLON POLYPS: Primary | ICD-10-CM

## 2024-09-23 PROCEDURE — 99213 OFFICE O/P EST LOW 20 MIN: CPT | Performed by: PHYSICIAN ASSISTANT

## 2024-09-23 RX ORDER — UBIDECARENONE/VIT E/VIT E MIX 100-20-15
100 CAPSULE ORAL DAILY
COMMUNITY

## 2024-09-23 NOTE — ASSESSMENT & PLAN NOTE
- Plan recall colonoscopy.  -Most recent CBC and CMP on July 8, 2024 shows normal BUN and creatinine and normal hemoglobin at 12.3.  -She will need to be off Eliquis for 2 days prior to the procedure.  It is her primary care Dr. Hercules who gives her the Eliquis for history of DVT  - I have reviewed the risks of endoscopy which include but are not limited to; anesthesia complications, injury/perforation of the bowel, infection and bleeding.  Patient given the opportunity to review the full consent form.

## 2024-09-23 NOTE — PROGRESS NOTES
Ambulatory Visit  Name: Jagruti Rodriguez      : 1945      MRN: [id number]  Encounter Provider: Tico Hsieh PA-C  Encounter Date: 2024   Encounter department: [facility] [place]    Assessment & Plan  History of colon polyps  - Plan recall colonoscopy.  -Most recent CBC and CMP on 2024 shows normal BUN and creatinine and normal hemoglobin at 12.3.  -She will need to be off Eliquis for 2 days prior to the procedure.  It is her primary care Dr. Hercules who gives her the Eliquis for history of DVT  - I have reviewed the risks of endoscopy which include but are not limited to; anesthesia complications, injury/perforation of the bowel, infection and bleeding.  Patient given the opportunity to review the full consent form.      Family history of colon cancer  -With her father    History of Present Illness     Jagruti Rodriguez is a 79 y.o. 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    Patient denies any abdominal pain, nausea/vomiting, pain or difficulty swallowing, change in bowels, black or bloody stools  No pacemaker or defibrillator implanted.   No chronic kidney disease or solitary kidney.  Not on any supplemental oxygen at night.  She is on Eliquis for history of DVT.      Review of Systems  As per HPI, a 12 point review of systems reviewed and otherwise negative        Objective     There were no vitals taken for this visit.    Physical Exam  Vitals reviewed.   Constitutional:       Appearance: Normal appearance.   HENT:      Head: Normocephalic and atraumatic.   Eyes:      General: No scleral icterus.  Cardiovascular:      Rate and Rhythm: Normal rate.   Pulmonary:      Effort: Pulmonary effort is normal. No  respiratory distress.      Breath sounds: Normal breath sounds.   Abdominal:      General: Bowel sounds are normal. There is no distension.      Palpations: Abdomen is soft.      Tenderness: There is no abdominal tenderness. There is no guarding.   Skin:     General: Skin is warm and dry.   Neurological:      Mental Status: She is alert and oriented to person, place, and time.   Psychiatric:         Mood and Affect: Mood normal.         Behavior: Behavior normal.

## 2024-09-23 NOTE — PATIENT INSTRUCTIONS
Scheduled date of colonoscopy (as of today): Oct. 22, 2024   Physician performing colonoscopy: Dr. Gonzales   Location of colonoscopy: [facility]   Bowel prep reviewed with patient: mira/dulc   Instructions reviewed with patient by: OSCAR  Clearances: Kavin

## 2024-10-07 ENCOUNTER — ANESTHESIA (OUTPATIENT)
Dept: ANESTHESIOLOGY | Facility: HOSPITAL | Age: 79
End: 2024-10-07

## 2024-10-07 ENCOUNTER — ANESTHESIA EVENT (OUTPATIENT)
Dept: ANESTHESIOLOGY | Facility: HOSPITAL | Age: 79
End: 2024-10-07

## 2024-10-11 ENCOUNTER — TELEPHONE (OUTPATIENT)
Dept: GASTROENTEROLOGY | Facility: CLINIC | Age: 79
End: 2024-10-11

## 2024-10-11 NOTE — TELEPHONE ENCOUNTER
Called and spoke confirming pt's colonoscopy scheduled on 10/22/24 at [facility] with Dr Gonzales.  Informed [facility] would be calling the day prior with the arrival time.  Informed to hold Eliquis 2 days prior to procedure. Informed of clear liquid diet day prior as well as the bowel cleansing preparation.  Informed would need a  the day of the procedure due to being under sedation. Pt has instructions and did not have any questions.

## 2024-10-15 ENCOUNTER — TELEPHONE (OUTPATIENT)
Dept: GASTROENTEROLOGY | Facility: AMBULARY SURGERY CENTER | Age: 79
End: 2024-10-15

## 2024-10-15 NOTE — TELEPHONE ENCOUNTER
Dr Gonzales had a change in his schedule on 10/22/24 and pt's procedure needed to be rescheduled.  Called and spoke to pt and rescheduled to 11/22/24.

## 2024-11-14 ENCOUNTER — TELEPHONE (OUTPATIENT)
Dept: GASTROENTEROLOGY | Facility: CLINIC | Age: 79
End: 2024-11-14

## 2024-11-14 NOTE — TELEPHONE ENCOUNTER
Spoke to pt confirming pt's colonoscopy scheduled on 11/22/24 at [facility] with Dr Gonzales.  Informed [facility] would be calling the day prior with the arrival time. Advised pt to hold Eliquis 2 days prior to the arrival time. Pt has instructions and did not have any questions.

## 2024-11-22 ENCOUNTER — HOSPITAL ENCOUNTER (OUTPATIENT)
Dept: GASTROENTEROLOGY | Facility: AMBULARY SURGERY CENTER | Age: 79
Setting detail: OUTPATIENT SURGERY
End: 2024-11-22
Attending: INTERNAL MEDICINE
Payer: MEDICARE

## 2024-11-22 ENCOUNTER — ANESTHESIA EVENT (OUTPATIENT)
Dept: GASTROENTEROLOGY | Facility: AMBULARY SURGERY CENTER | Age: 79
End: 2024-11-22
Payer: MEDICARE

## 2024-11-22 VITALS
BODY MASS INDEX: 34.15 KG/M2 | OXYGEN SATURATION: 97 % | WEIGHT: 200 LBS | HEART RATE: 52 BPM | RESPIRATION RATE: 18 BRPM | DIASTOLIC BLOOD PRESSURE: 71 MMHG | HEIGHT: 64 IN | SYSTOLIC BLOOD PRESSURE: 134 MMHG | TEMPERATURE: 96.9 F

## 2024-11-22 DIAGNOSIS — Z80.0 FAMILY HISTORY OF COLON CANCER: ICD-10-CM

## 2024-11-22 DIAGNOSIS — Z86.0100 HISTORY OF COLON POLYPS: ICD-10-CM

## 2024-11-22 PROCEDURE — 45385 COLONOSCOPY W/LESION REMOVAL: CPT | Performed by: INTERNAL MEDICINE

## 2024-11-22 PROCEDURE — 88305 TISSUE EXAM BY PATHOLOGIST: CPT | Performed by: PATHOLOGY

## 2024-11-22 RX ORDER — PROPOFOL 10 MG/ML
INJECTION, EMULSION INTRAVENOUS CONTINUOUS PRN
Status: DISCONTINUED | OUTPATIENT
Start: 2024-11-22 | End: 2024-11-22

## 2024-11-22 RX ORDER — SODIUM CHLORIDE, SODIUM LACTATE, POTASSIUM CHLORIDE, CALCIUM CHLORIDE 600; 310; 30; 20 MG/100ML; MG/100ML; MG/100ML; MG/100ML
INJECTION, SOLUTION INTRAVENOUS CONTINUOUS PRN
Status: DISCONTINUED | OUTPATIENT
Start: 2024-11-22 | End: 2024-11-22

## 2024-11-22 RX ORDER — SODIUM CHLORIDE, SODIUM LACTATE, POTASSIUM CHLORIDE, CALCIUM CHLORIDE 600; 310; 30; 20 MG/100ML; MG/100ML; MG/100ML; MG/100ML
125 INJECTION, SOLUTION INTRAVENOUS CONTINUOUS
Status: DISPENSED | OUTPATIENT
Start: 2024-11-22

## 2024-11-22 RX ORDER — PROPOFOL 10 MG/ML
INJECTION, EMULSION INTRAVENOUS AS NEEDED
Status: DISCONTINUED | OUTPATIENT
Start: 2024-11-22 | End: 2024-11-22

## 2024-11-22 RX ADMIN — PROPOFOL 100 MG: 10 INJECTION, EMULSION INTRAVENOUS at 10:37

## 2024-11-22 RX ADMIN — PROPOFOL 50 MCG/KG/MIN: 10 INJECTION, EMULSION INTRAVENOUS at 10:37

## 2024-11-22 RX ADMIN — SODIUM CHLORIDE, SODIUM LACTATE, POTASSIUM CHLORIDE, AND CALCIUM CHLORIDE 125 ML/HR: .6; .31; .03; .02 INJECTION, SOLUTION INTRAVENOUS at 10:08

## 2024-11-22 RX ADMIN — SODIUM CHLORIDE, SODIUM LACTATE, POTASSIUM CHLORIDE, AND CALCIUM CHLORIDE: .6; .31; .03; .02 INJECTION, SOLUTION INTRAVENOUS at 10:34

## 2024-11-22 NOTE — ANESTHESIA POSTPROCEDURE EVALUATION
Post-Op Assessment Note    CV Status:  Stable  Pain Score: 0    Pain management: adequate       Mental Status:  Alert and awake   Hydration Status:  Euvolemic   PONV Controlled:  Controlled   Airway Patency:  Patent     Post Op Vitals Reviewed: Yes    No anethesia notable event occurred.    Staff: Anesthesiologist, CRNA           Last Filed PACU Vitals:  Vitals Value Taken Time   Temp     Pulse     BP     Resp     SpO2         Modified Marek:  Activity: 2 (11/22/2024  9:50 AM)  Respiration: 2 (11/22/2024  9:50 AM)  Circulation: 2 (11/22/2024  9:50 AM)  Consciousness: 2 (11/22/2024  9:50 AM)  Oxygen Saturation: 2 (11/22/2024  9:50 AM)  Modified Marek Score: 10 (11/22/2024  9:50 AM)

## 2024-11-22 NOTE — ANESTHESIA PREPROCEDURE EVALUATION
Procedure:  COLONOSCOPY    Relevant Problems   CARDIO   (+) Hypertension, essential   (+) Other hyperlipidemia      GI/HEPATIC   (+) Gastroesophageal reflux disease      Oncology   (+) Family history of colon cancer      Other   (+) History of DVT of lower extremity        Physical Exam    Airway    Mallampati score: II  TM Distance: >3 FB  Neck ROM: full     Dental   Comment: Denies loose teeth     Cardiovascular  Cardiovascular exam normal    Pulmonary  Pulmonary exam normal     Other Findings  Portions of exam deferred due to low yield and/or unknown COVID statuspost-pubertal.      Anesthesia Plan  ASA Score- 2     Anesthesia Type- IV sedation with anesthesia with ASA Monitors.         Additional Monitors:     Airway Plan:            Plan Factors-Exercise tolerance (METS): >4 METS.    Chart reviewed.   Existing labs reviewed. Patient summary reviewed.    Patient is not a current smoker.              Induction- intravenous.    Postoperative Plan-         Informed Consent- Anesthetic plan and risks discussed with patient.  I personally reviewed this patient with the CRNA. Discussed and agreed on the Anesthesia Plan with the CRNA..

## 2024-11-22 NOTE — H&P
"History and Physical -  [facility]  Jagruti Rodriguez 79 y.o. female MRN: [id number]                  HPI: Jagruti Rodriguez is a 79 y.o. year old female who presents for history of polyp      REVIEW OF SYSTEMS: Per the HPI, and otherwise unremarkable.    Historical Information   Past Medical History:   Diagnosis Date    DVT (deep venous thrombosis) (HCC)     Hyperlipidemia     Hypertension      History reviewed. No pertinent surgical history.  Social History   Social History     Substance and Sexual Activity   Alcohol Use Yes    Alcohol/week: 1.0 standard drink of alcohol    Types: 1 Glasses of wine per week    Comment: socially     Social History     Substance and Sexual Activity   Drug Use Never     Social History     Tobacco Use   Smoking Status Never   Smokeless Tobacco Never     Family History   Problem Relation Age of Onset    Colon cancer Father     Heart disease Maternal Grandmother        Meds/Allergies       Current Outpatient Medications:     Calcium Carbonate-Vit D-Min (CALCIUM 1200 PO)    Cholecalciferol (VITAMIN D3) 2000 units TABS    Co-Enzyme Q10 100 MG CAPS    lisinopril-hydrochlorothiazide (PRINZIDE,ZESTORETIC) 20-12.5 MG per tablet    metoprolol succinate (TOPROL-XL) 50 mg 24 hr tablet    multivitamin (THERAGRAN) TABS    NON FORMULARY    simvastatin (ZOCOR) 20 mg tablet    apixaban (Eliquis) 5 mg    co-enzyme Q-10 30 MG capsule    triamcinolone acetonide (KENALOG-40) 40 mg/mL    Allergies   Allergen Reactions    Penicillins Hives    Pollen Extract Allergic Rhinitis       Objective     /80   Pulse (!) 53   Temp (!) 96.9 °F (36.1 °C) (Temporal)   Resp 17   Ht 5' 4\" (1.626 m)   Wt 90.7 kg (200 lb)   SpO2 95%   BMI 34.33 kg/m²       PHYSICAL EXAM    Gen: NAD  Head: NCAT  CV: RRR  CHEST: Clear  ABD: soft, NT/ND  EXT: no edema      ASSESSMENT/PLAN:  This is a 79 y.o. year old female here for colonoscopy, and she is stable and optimized for her procedure.        "

## 2024-11-29 ENCOUNTER — RESULTS FOLLOW-UP (OUTPATIENT)
Dept: GASTROENTEROLOGY | Facility: CLINIC | Age: 79
End: 2024-11-29

## 2024-11-29 PROCEDURE — 88305 TISSUE EXAM BY PATHOLOGIST: CPT | Performed by: PATHOLOGY

## 2024-11-29 NOTE — RESULT ENCOUNTER NOTE
Patient had adenoma polyp removed during colonoscopy.  This is considered precancerous polyp.  Patient should have a follow-up colonoscopy in 5 years, if overall health is stable..  If any GI symptoms call our office for evaluation accordingly.

## 2024-12-27 ENCOUNTER — APPOINTMENT (EMERGENCY)
Dept: RADIOLOGY | Facility: HOSPITAL | Age: 79
End: 2024-12-27
Payer: MEDICARE

## 2024-12-27 ENCOUNTER — HOSPITAL ENCOUNTER (EMERGENCY)
Facility: HOSPITAL | Age: 79
Discharge: HOME | End: 2024-12-27
Attending: STUDENT IN AN ORGANIZED HEALTH CARE EDUCATION/TRAINING PROGRAM
Payer: MEDICARE

## 2024-12-27 VITALS
SYSTOLIC BLOOD PRESSURE: 163 MMHG | OXYGEN SATURATION: 98 % | RESPIRATION RATE: 18 BRPM | DIASTOLIC BLOOD PRESSURE: 67 MMHG | WEIGHT: 200 LBS | HEART RATE: 59 BPM | BODY MASS INDEX: 34.15 KG/M2 | HEIGHT: 64 IN | TEMPERATURE: 98 F

## 2024-12-27 DIAGNOSIS — R10.9 FLANK PAIN: Primary | ICD-10-CM

## 2024-12-27 LAB
ALBUMIN SERPL BCG-MCNC: 3.8 G/DL (ref 3.5–5)
ALP SERPL-CCNC: 61 U/L (ref 34–104)
ALT SERPL W P-5'-P-CCNC: 17 U/L (ref 7–52)
ANION GAP SERPL CALCULATED.3IONS-SCNC: 5 MMOL/L (ref 4–13)
AST SERPL W P-5'-P-CCNC: 11 U/L (ref 13–39)
BACTERIA UR QL AUTO: NORMAL /HPF
BASOPHILS # BLD AUTO: 0.06 THOUSANDS/ÂΜL (ref 0–0.1)
BASOPHILS NFR BLD AUTO: 1 % (ref 0–1)
BILIRUB SERPL-MCNC: 0.46 MG/DL (ref 0.2–1)
BILIRUB UR QL STRIP: NEGATIVE
BUN SERPL-MCNC: 23 MG/DL (ref 5–25)
CALCIUM SERPL-MCNC: 9.2 MG/DL (ref 8.4–10.2)
CHLORIDE SERPL-SCNC: 103 MMOL/L (ref 96–108)
CLARITY UR: CLEAR
CO2 SERPL-SCNC: 29 MMOL/L (ref 21–32)
COLOR UR: YELLOW
CREAT SERPL-MCNC: 0.94 MG/DL (ref 0.6–1.3)
EOSINOPHIL # BLD AUTO: 0.06 THOUSAND/ÂΜL (ref 0–0.61)
EOSINOPHIL NFR BLD AUTO: 1 % (ref 0–6)
ERYTHROCYTE [DISTWIDTH] IN BLOOD BY AUTOMATED COUNT: 12.8 % (ref 11.6–15.1)
GFR SERPL CREATININE-BSD FRML MDRD: 57 ML/MIN/1.73SQ M
GLUCOSE SERPL-MCNC: 133 MG/DL (ref 65–140)
GLUCOSE UR STRIP-MCNC: NEGATIVE MG/DL
HCT VFR BLD AUTO: 34.3 % (ref 34.8–46.1)
HGB BLD-MCNC: 11.4 G/DL (ref 11.5–15.4)
HGB UR QL STRIP.AUTO: ABNORMAL
IMM GRANULOCYTES # BLD AUTO: 0.02 THOUSAND/UL (ref 0–0.2)
IMM GRANULOCYTES NFR BLD AUTO: 0 % (ref 0–2)
KETONES UR STRIP-MCNC: NEGATIVE MG/DL
LEUKOCYTE ESTERASE UR QL STRIP: NEGATIVE
LIPASE SERPL-CCNC: 23 U/L (ref 11–82)
LYMPHOCYTES # BLD AUTO: 1.2 THOUSANDS/ÂΜL (ref 0.6–4.47)
LYMPHOCYTES NFR BLD AUTO: 20 % (ref 14–44)
MCH RBC QN AUTO: 29.4 PG (ref 26.8–34.3)
MCHC RBC AUTO-ENTMCNC: 33.2 G/DL (ref 31.4–37.4)
MCV RBC AUTO: 88 FL (ref 82–98)
MONOCYTES # BLD AUTO: 0.36 THOUSAND/ÂΜL (ref 0.17–1.22)
MONOCYTES NFR BLD AUTO: 6 % (ref 4–12)
NEUTROPHILS # BLD AUTO: 4.29 THOUSANDS/ÂΜL (ref 1.85–7.62)
NEUTS SEG NFR BLD AUTO: 72 % (ref 43–75)
NITRITE UR QL STRIP: NEGATIVE
NON-SQ EPI CELLS URNS QL MICRO: NORMAL /HPF
NRBC BLD AUTO-RTO: 0 /100 WBCS
PH UR STRIP.AUTO: 6 [PH]
PLATELET # BLD AUTO: 225 THOUSANDS/UL (ref 149–390)
PMV BLD AUTO: 10.2 FL (ref 8.9–12.7)
POTASSIUM SERPL-SCNC: 3.7 MMOL/L (ref 3.5–5.3)
PROT SERPL-MCNC: 6.5 G/DL (ref 6.4–8.4)
PROT UR STRIP-MCNC: NEGATIVE MG/DL
RBC # BLD AUTO: 3.88 MILLION/UL (ref 3.81–5.12)
RBC #/AREA URNS AUTO: NORMAL /HPF
SODIUM SERPL-SCNC: 137 MMOL/L (ref 135–147)
SP GR UR STRIP.AUTO: 1.02 (ref 1–1.03)
UROBILINOGEN UR STRIP-ACNC: <2 MG/DL
WBC # BLD AUTO: 5.99 THOUSAND/UL (ref 4.31–10.16)
WBC #/AREA URNS AUTO: NORMAL /HPF

## 2024-12-27 PROCEDURE — 74177 CT ABD & PELVIS W/CONTRAST: CPT

## 2024-12-27 PROCEDURE — 80053 COMPREHEN METABOLIC PANEL: CPT | Performed by: STUDENT IN AN ORGANIZED HEALTH CARE EDUCATION/TRAINING PROGRAM

## 2024-12-27 PROCEDURE — 99285 EMERGENCY DEPT VISIT HI MDM: CPT

## 2024-12-27 PROCEDURE — 81001 URINALYSIS AUTO W/SCOPE: CPT | Performed by: STUDENT IN AN ORGANIZED HEALTH CARE EDUCATION/TRAINING PROGRAM

## 2024-12-27 PROCEDURE — 83690 ASSAY OF LIPASE: CPT | Performed by: STUDENT IN AN ORGANIZED HEALTH CARE EDUCATION/TRAINING PROGRAM

## 2024-12-27 PROCEDURE — 99285 EMERGENCY DEPT VISIT HI MDM: CPT | Performed by: STUDENT IN AN ORGANIZED HEALTH CARE EDUCATION/TRAINING PROGRAM

## 2024-12-27 PROCEDURE — 36415 COLL VENOUS BLD VENIPUNCTURE: CPT | Performed by: STUDENT IN AN ORGANIZED HEALTH CARE EDUCATION/TRAINING PROGRAM

## 2024-12-27 PROCEDURE — 85025 COMPLETE CBC W/AUTO DIFF WBC: CPT | Performed by: STUDENT IN AN ORGANIZED HEALTH CARE EDUCATION/TRAINING PROGRAM

## 2024-12-27 PROCEDURE — 96374 THER/PROPH/DIAG INJ IV PUSH: CPT

## 2024-12-27 RX ORDER — LIDOCAINE 50 MG/G
1 PATCH TOPICAL ONCE
Status: DISCONTINUED | OUTPATIENT
Start: 2024-12-27 | End: 2024-12-27 | Stop reason: HOSPADM

## 2024-12-27 RX ORDER — OXYCODONE HYDROCHLORIDE 5 MG/1
5 TABLET ORAL EVERY 6 HOURS PRN
Qty: 6 TABLET | Refills: 0 | Status: SHIPPED | OUTPATIENT
Start: 2024-12-27 | End: 2025-01-06

## 2024-12-27 RX ORDER — ACETAMINOPHEN 10 MG/ML
1000 INJECTION, SOLUTION INTRAVENOUS ONCE
Status: COMPLETED | OUTPATIENT
Start: 2024-12-27 | End: 2024-12-27

## 2024-12-27 RX ADMIN — LIDOCAINE 1 PATCH: 50 PATCH TOPICAL at 12:17

## 2024-12-27 RX ADMIN — ACETAMINOPHEN 1000 MG: 1000 INJECTION, SOLUTION INTRAVENOUS at 10:27

## 2024-12-27 RX ADMIN — IOHEXOL 100 ML: 350 INJECTION, SOLUTION INTRAVENOUS at 11:12

## 2024-12-27 NOTE — ED PROVIDER NOTES
Time reflects when diagnosis was documented in both MDM as applicable and the Disposition within this note       Time User Action Codes Description Comment    12/27/2024 12:34 PM Tobias Shepherd Add [R10.9] Flank pain           ED Disposition       ED Disposition   Discharge    Condition   Stable    Date/Time   Fri Dec 27, 2024 12:14 PM    Comment   Jagrutividal Rodriguez discharge to home/self care.                   Assessment & Plan       Medical Decision Making  Patient is a 79 y.o. female who presents to the ED for right lower back/flank pain.  Patient is nontoxic, well-appearing.     Differential includes but is not limited to: MSK pain, sciatica, kidney stone.  Presentation not consistent with spinal cord compression (cauda equina, conus medullaris).  Presentation not consistent with AAA.  Doubt urinary tract infection given no urinary symptoms.    Plan: Labs, CT imaging, pain control, reassess.                   Amount and/or Complexity of Data Reviewed  Labs: ordered.  Radiology: ordered.    Risk  Prescription drug management.        ED Course as of 12/27/24 1618   Fri Dec 27, 2024   1236 Patient reevaluated.  Resting comfortably.  Discussed results and findings.  Explained no obvious emergent cause of her symptoms.  Will discharge.  Discussed outpatient follow-up.  Return precautions discussed.  Patient verbalized understanding and agreed to plan of care.       Medications   acetaminophen (Ofirmev) injection 1,000 mg (0 mg Intravenous Stopped 12/27/24 1042)   iohexol (OMNIPAQUE) 350 MG/ML injection (MULTI-DOSE) 100 mL (100 mL Intravenous Given 12/27/24 1112)       ED Risk Strat Scores                          SBIRT 20yo+      Flowsheet Row Most Recent Value   Initial Alcohol Screen: US AUDIT-C     1. How often do you have a drink containing alcohol? 0 Filed at: 12/27/2024 0947   2. How many drinks containing alcohol do you have on a typical day you are drinking?  0 Filed at: 12/27/2024 0947   3a. Male UNDER  65: How often do you have five or more drinks on one occasion? 0 Filed at: 12/27/2024 0947   3b. FEMALE Any Age, or MALE 65+: How often do you have 4 or more drinks on one occassion? 0 Filed at: 12/27/2024 0947   Audit-C Score 0 Filed at: 12/27/2024 0947   ROSY: How many times in the past year have you...    Used an illegal drug or used a prescription medication for non-medical reasons? Never Filed at: 12/27/2024 0947                            History of Present Illness       Chief Complaint   Patient presents with    Flank Pain     Right flank pain that radiates to groin area for about a month but thought she'd wait until after susie to come in       Past Medical History:   Diagnosis Date    DVT (deep venous thrombosis) (HCC)     Hyperlipidemia     Hypertension       History reviewed. No pertinent surgical history.   Family History   Problem Relation Age of Onset    Colon cancer Father     Heart disease Maternal Grandmother       Social History     Tobacco Use    Smoking status: Never    Smokeless tobacco: Never   Vaping Use    Vaping status: Never Used   Substance Use Topics    Alcohol use: Yes     Alcohol/week: 1.0 standard drink of alcohol     Types: 1 Glasses of wine per week     Comment: socially    Drug use: Never      E-Cigarette/Vaping    E-Cigarette Use Never User       E-Cigarette/Vaping Substances    Nicotine No     THC No     CBD No     Flavoring No     Other No     Unknown No       I have reviewed and agree with the history as documented.     Patient is a 79-year-old female, past medical history including hyperlipidemia, hypertension, who presents the emergency room for right-sided lower back/flank pain.  Has not present for about a month.  Worse with certain movements.  Has started to radiate towards the front of her abdomen.  No other modifying factors.  No associated symptoms.  No trouble urinating/urinary symptoms.  No fevers or chills.  No trauma.  No numbness, tingling, weakness.  No history  of kidney stones.  Has had somewhat similar pain in the past.  No other complaints or concerns.      Flank Pain      Review of Systems   Genitourinary:  Positive for flank pain.   All other systems reviewed and are negative.          Objective       ED Triage Vitals   Temperature Pulse Blood Pressure Respirations SpO2 Patient Position - Orthostatic VS   12/27/24 0948 12/27/24 0950 12/27/24 0950 12/27/24 0948 12/27/24 0950 12/27/24 1224   97.6 °F (36.4 °C) 63 139/73 18 98 % Lying      Temp Source Heart Rate Source BP Location FiO2 (%) Pain Score    12/27/24 1224 12/27/24 1224 12/27/24 1224 -- 12/27/24 0948    Oral Monitor Right arm  7      Vitals      Date and Time Temp Pulse SpO2 Resp BP Pain Score FACES Pain Rating User   12/27/24 1224 98 °F (36.7 °C) 59 98 % 18 163/67 5 -- SF   12/27/24 0950 -- 63 98 % -- 139/73 -- -- NANCY   12/27/24 0948 97.6 °F (36.4 °C) -- -- 18 -- 7 -- NANCY            Physical Exam  Vitals and nursing note reviewed.   Constitutional:       General: She is not in acute distress.     Appearance: She is well-developed. She is not ill-appearing, toxic-appearing or diaphoretic.   HENT:      Head: Normocephalic and atraumatic.      Right Ear: External ear normal.      Left Ear: External ear normal.      Nose: Nose normal.   Eyes:      General: Lids are normal. No scleral icterus.  Cardiovascular:      Rate and Rhythm: Normal rate and regular rhythm.      Heart sounds: Normal heart sounds. No murmur heard.     No friction rub. No gallop.   Pulmonary:      Effort: Pulmonary effort is normal. No respiratory distress.      Breath sounds: Normal breath sounds. No wheezing or rales.   Abdominal:      Palpations: Abdomen is soft.      Tenderness: There is no abdominal tenderness. There is no guarding or rebound.   Musculoskeletal:         General: No deformity. Normal range of motion.      Cervical back: Normal range of motion and neck supple.        Back:    Skin:     General: Skin is warm and dry.    Neurological:      General: No focal deficit present.      Mental Status: She is alert.   Psychiatric:         Mood and Affect: Mood normal.         Behavior: Behavior normal.         Results Reviewed       Procedure Component Value Units Date/Time    Urine Microscopic [[id number]]  (Normal) Collected: 12/27/24 1056    Lab Status: Final result Specimen: Urine, Clean Catch Updated: 12/27/24 1227     RBC, UA 2-4 /hpf      WBC, UA 1-2 /hpf      Epithelial Cells Occasional /hpf      Bacteria, UA Occasional /hpf     UA w Reflex to Microscopic w Reflex to Culture [[id number]]  (Abnormal) Collected: 12/27/24 1056    Lab Status: Final result Specimen: Urine, Clean Catch Updated: 12/27/24 1120     Color, UA Yellow     Clarity, UA Clear     Specific Gravity, UA 1.020     pH, UA 6.0     Leukocytes, UA Negative     Nitrite, UA Negative     Protein, UA Negative mg/dl      Glucose, UA Negative mg/dl      Ketones, UA Negative mg/dl      Urobilinogen, UA <2.0 mg/dl      Bilirubin, UA Negative     Occult Blood, UA Trace    CMP [[id number]]  (Abnormal) Collected: 12/27/24 1026    Lab Status: Final result Specimen: Blood from Arm, Left Updated: 12/27/24 1051     Sodium 137 mmol/L      Potassium 3.7 mmol/L      Chloride 103 mmol/L      CO2 29 mmol/L      ANION GAP 5 mmol/L      BUN 23 mg/dL      Creatinine 0.94 mg/dL      Glucose 133 mg/dL      Calcium 9.2 mg/dL      AST 11 U/L      ALT 17 U/L      Alkaline Phosphatase 61 U/L      Total Protein 6.5 g/dL      Albumin 3.8 g/dL      Total Bilirubin 0.46 mg/dL      eGFR 57 ml/min/1.73sq m     Narrative:      National Kidney Disease Foundation guidelines for Chronic Kidney Disease (CKD):     Stage 1 with normal or high GFR (GFR > 90 mL/min/1.73 square meters)    Stage 2 Mild CKD (GFR = 60-89 mL/min/1.73 square meters)    Stage 3A Moderate CKD (GFR = 45-59 mL/min/1.73 square meters)    Stage 3B Moderate CKD (GFR = 30-44 mL/min/1.73 square meters)    Stage 4 Severe CKD (GFR = 15-29 mL/min/1.73  square meters)    Stage 5 End Stage CKD (GFR <15 mL/min/1.73 square meters)  Note: GFR calculation is accurate only with a steady state creatinine    Lipase [[id number]]  (Normal) Collected: 12/27/24 1026    Lab Status: Final result Specimen: Blood from Arm, Left Updated: 12/27/24 1051     Lipase 23 u/L     CBC and differential [[id number]]  (Abnormal) Collected: 12/27/24 1026    Lab Status: Final result Specimen: Blood from Arm, Left Updated: 12/27/24 1035     WBC 5.99 Thousand/uL      RBC 3.88 Million/uL      Hemoglobin 11.4 g/dL      Hematocrit 34.3 %      MCV 88 fL      MCH 29.4 pg      MCHC 33.2 g/dL      RDW 12.8 %      MPV 10.2 fL      Platelets 225 Thousands/uL      nRBC 0 /100 WBCs      Segmented % 72 %      Immature Grans % 0 %      Lymphocytes % 20 %      Monocytes % 6 %      Eosinophils Relative 1 %      Basophils Relative 1 %      Absolute Neutrophils 4.29 Thousands/µL      Absolute Immature Grans 0.02 Thousand/uL      Absolute Lymphocytes 1.20 Thousands/µL      Absolute Monocytes 0.36 Thousand/µL      Eosinophils Absolute 0.06 Thousand/µL      Basophils Absolute 0.06 Thousands/µL             CT Abdomen pelvis with contrast   Final Interpretation by Dorothy Valdivia MD (12/27 1211)   No acute intra-abdominal pathology.   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   Incidental findings, as per the body of the report.            Workstation performed: [id number]             Procedures    ED Medication and Procedure Management   Prior to Admission Medications   Prescriptions Last Dose Informant Patient Reported? Taking?   Calcium Carbonate-Vit D-Min (CALCIUM 1200 PO)  Self Yes No   Sig: Take by mouth   Cholecalciferol (VITAMIN D3) 2000 units TABS  Self Yes No   Sig: Take 2,000 Units by mouth daily   Co-Enzyme Q10 100 MG CAPS   Yes No   Sig: Take 100 mg by mouth daily   NON FORMULARY   Yes No   Sig: Cortisone injection back   apixaban  (Eliquis) 5 mg   No No   Sig: Take 1 tablet (5 mg total) by mouth 2 (two) times a day   co-enzyme Q-10 30 MG capsule  Self Yes No   Sig: Take 30 mg by mouth 3 (three) times a day   lisinopril-hydrochlorothiazide (PRINZIDE,ZESTORETIC) 20-12.5 MG per tablet  Self Yes No   Sig: Take by mouth   metoprolol succinate (TOPROL-XL) 50 mg 24 hr tablet  Self Yes No   Sig: Take 50 mg by mouth daily   multivitamin (THERAGRAN) TABS  Self Yes No   Sig: Take 1 tablet by mouth daily   simvastatin (ZOCOR) 20 mg tablet  Self Yes No   Sig: Take 20 mg by mouth every evening   triamcinolone acetonide (KENALOG-40) 40 mg/mL   Yes No   Sig: Inject into a muscle      Facility-Administered Medications: None     Discharge Medication List as of 12/27/2024 12:36 PM        START taking these medications    Details   oxyCODONE (Roxicodone) 5 immediate release tablet Take 1 tablet (5 mg total) by mouth every 6 (six) hours as needed for moderate pain for up to 10 days Max Daily Amount: 20 mg, Starting Fri 12/27/2024, Until Mon 1/6/2025 at 2359, Normal           CONTINUE these medications which have NOT CHANGED    Details   apixaban (Eliquis) 5 mg Take 1 tablet (5 mg total) by mouth 2 (two) times a day, Starting Mon 10/30/2023, Normal      Calcium Carbonate-Vit D-Min (CALCIUM 1200 PO) Take by mouth, Historical Med      Cholecalciferol (VITAMIN D3) 2000 units TABS Take 2,000 Units by mouth daily, Historical Med      !! co-enzyme Q-10 30 MG capsule Take 30 mg by mouth 3 (three) times a day, Historical Med      !! Co-Enzyme Q10 100 MG CAPS Take 100 mg by mouth daily, Historical Med      lisinopril-hydrochlorothiazide (PRINZIDE,ZESTORETIC) 20-12.5 MG per tablet Take by mouth, Starting Tue 11/9/2010, Historical Med      metoprolol succinate (TOPROL-XL) 50 mg 24 hr tablet Take 50 mg by mouth daily, Starting Mon 3/28/2022, Historical Med      multivitamin (THERAGRAN) TABS Take 1 tablet by mouth daily, Historical Med      NON FORMULARY Cortisone injection  back, Historical Med      simvastatin (ZOCOR) 20 mg tablet Take 20 mg by mouth every evening, Starting Mon 3/28/2022, Historical Med      triamcinolone acetonide (KENALOG-40) 40 mg/mL Inject into a muscle, Starting Mon 11/6/2023, Historical Med       !! - Potential duplicate medications found. Please discuss with provider.          ED SEPSIS DOCUMENTATION   Time reflects when diagnosis was documented in both MDM as applicable and the Disposition within this note       Time User Action Codes Description Comment    12/27/2024 12:34 PM Tobias Shepherd Add [R10.9] Flank pain                  Tobias Shepherd, DO  12/27/24 1613

## 2024-12-27 NOTE — DISCHARGE INSTRUCTIONS
"You were evaluated in the Emergency Department today for your back pain. Your evaluation did not show signs of medical conditions requiring emergent intervention at this time.    Please schedule an appointment for follow-up with your primary care physician this week for further evaluation of your symptoms. If you do not have a primary care doctor, please call \"infolink\" to schedule an appointment with one.     It is recommended that you follow up with [facility]. A referral was sent. Please call to schedule an appointment (Call [phone]), option [phone]). You may read more about this program at the following link:     [email or web address]      Return to the Emergency Department if you experience worsening back pain, difficulty walking, fevers, numbness, tingling, incontinence, or any other concerning symptoms.  "

## 2024-12-30 ENCOUNTER — TELEPHONE (OUTPATIENT)
Dept: PHYSICAL THERAPY | Facility: OTHER | Age: 79
End: 2024-12-30

## 2025-01-02 ENCOUNTER — NURSE TRIAGE (OUTPATIENT)
Dept: PHYSICAL THERAPY | Facility: OTHER | Age: 80
End: 2025-01-02

## 2025-01-02 NOTE — TELEPHONE ENCOUNTER
Additional Information   Negative: Is this related to a work injury?   Negative: Is this related to an MVA?   Negative: Are you currently recieving homecare services?    Background - Initial Assessment  Clinical complaint: Pain is right low back radiates to front of abdomen, no longer into the groin area. No numbness or tingling. Started 1 month ago. NKI. Did have this pain a couple months ago, it went away on its own and she did not seek evaluation. No prior back SX. Seen in ED 12/27/24. Pain is now intermittent (lidocaine patches are helping) and feels burning or sharp at times.   Date of onset: 1 month  Frequency of pain: intermittent  Quality of pain: burning and sharp    Protocols used: [facility] Protocol

## 2025-01-07 NOTE — TELEPHONE ENCOUNTER
"Nurse reached out proceed with the triage initiated last week.    Patient stated her prior complaints have resolved and declined to participate at this time. Nurse voiced she was pleased to hear the patients BP was relieved.   Nurse agreed and understood the patients decision at this time. Encouraged her to contact [facility] if needed in the future and contact information confirmed.     Patient was very appreciative of f/u call and discussion.RN also offered apology for the CB delay and thanked her for her understanding.  Patient appreciated the \"kind words\" and will CB if necessary.    Referral closed per protocol. Reminded patient she would not need a new referral to [facility] if the dx is the same. Patient understood.   "

## 2025-02-21 ENCOUNTER — OFFICE VISIT (OUTPATIENT)
Dept: URGENT CARE | Facility: CLINIC | Age: 80
End: 2025-02-21
Payer: MEDICARE

## 2025-02-21 VITALS
OXYGEN SATURATION: 98 % | WEIGHT: 201 LBS | TEMPERATURE: 98.3 F | HEART RATE: 75 BPM | BODY MASS INDEX: 34.5 KG/M2 | RESPIRATION RATE: 16 BRPM | SYSTOLIC BLOOD PRESSURE: 138 MMHG | DIASTOLIC BLOOD PRESSURE: 78 MMHG

## 2025-02-21 DIAGNOSIS — H10.33 ACUTE CONJUNCTIVITIS OF BOTH EYES, UNSPECIFIED ACUTE CONJUNCTIVITIS TYPE: Primary | ICD-10-CM

## 2025-02-21 PROCEDURE — 99213 OFFICE O/P EST LOW 20 MIN: CPT

## 2025-02-21 RX ORDER — OFLOXACIN 3 MG/ML
1 SOLUTION/ DROPS OPHTHALMIC 4 TIMES DAILY
Qty: 5 ML | Refills: 0 | Status: SHIPPED | OUTPATIENT
Start: 2025-02-21

## 2025-02-21 NOTE — PROGRESS NOTES
[facility]        NAME: Jagruti Rodriguez is a 80 y.o. female  : 1945    MRN: [id number]  DATE: 2025  TIME: 6:12 PM    Assessment and Plan   Acute conjunctivitis of both eyes, unspecified acute conjunctivitis type [H10.33]  1. Acute conjunctivitis of both eyes, unspecified acute conjunctivitis type  ofloxacin (OCUFLOX) 0.3 % ophthalmic solution            Patient Instructions     Antibiotic drops as prescribed   Wash pillow cases  Avoid touching eyes, encourage good hand hygiene   Avoid contact lens wearing while on antibiotics   Change contacts if you wear them  Avoid eye irritants    Follow up with PCP in 3-5 days.  Proceed to  ER if symptoms worsen.      If tests are performed, our office will contact you with results only if changes need to made to the care plan discussed with you at the visit. [facility] on [facility].    Chief Complaint     Chief Complaint   Patient presents with    Conjunctivitis     Pt c/o red itchy burning eyes that started 2 days ago.          History of Present Illness       Conjunctivitis   The current episode started 2 days ago. The onset was gradual. The problem has been gradually worsening. Associated symptoms include eye itching, eye discharge and eye redness. Pertinent negatives include no fever, no photophobia, no abdominal pain, no nausea, no vomiting, no congestion, no headaches, no rhinorrhea, no sore throat, no cough and no eye pain.       Review of Systems   Review of Systems   Constitutional:  Negative for chills and fever.   HENT:  Negative for congestion, postnasal drip, rhinorrhea, sinus pressure, sore throat and trouble swallowing.    Eyes:  Positive for discharge, redness and itching. Negative for photophobia, pain and visual disturbance.   Respiratory:  Negative for cough, chest tightness and shortness of breath.    Cardiovascular:  Negative for chest pain and palpitations.   Gastrointestinal:  Negative for  abdominal pain, nausea and vomiting.   Genitourinary:  Negative for difficulty urinating.   Musculoskeletal:  Negative for myalgias.   Neurological:  Negative for dizziness and headaches.         Current Medications       Current Outpatient Medications:     apixaban (Eliquis) 5 mg, Take 1 tablet (5 mg total) by mouth 2 (two) times a day, Disp: 60 tablet, Rfl: 5    Calcium Carbonate-Vit D-Min (CALCIUM 1200 PO), Take by mouth, Disp: , Rfl:     Cholecalciferol (VITAMIN D3) 2000 units TABS, Take 2,000 Units by mouth daily, Disp: , Rfl:     Co-Enzyme Q10 100 MG CAPS, Take 100 mg by mouth daily, Disp: , Rfl:     lisinopril-hydrochlorothiazide (PRINZIDE,ZESTORETIC) 20-12.5 MG per tablet, Take by mouth, Disp: , Rfl:     metoprolol succinate (TOPROL-XL) 50 mg 24 hr tablet, Take 50 mg by mouth daily, Disp: , Rfl:     multivitamin (THERAGRAN) TABS, Take 1 tablet by mouth daily, Disp: , Rfl:     NON FORMULARY, Cortisone injection back, Disp: , Rfl:     ofloxacin (OCUFLOX) 0.3 % ophthalmic solution, Administer 1 drop to both eyes 4 (four) times a day, Disp: 5 mL, Rfl: 0    simvastatin (ZOCOR) 20 mg tablet, Take 20 mg by mouth every evening, Disp: , Rfl:     triamcinolone acetonide (KENALOG-40) 40 mg/mL, Inject into a muscle, Disp: , Rfl:     co-enzyme Q-10 30 MG capsule, Take 30 mg by mouth 3 (three) times a day, Disp: , Rfl:     Current Allergies     Allergies as of 02/21/2025 - Reviewed 02/21/2025   Allergen Reaction Noted    Penicillins Hives 03/04/2019    Pollen extract Allergic Rhinitis 11/22/2024            The following portions of the patient's history were reviewed and updated as appropriate: allergies, current medications, past family history, past medical history, past social history, past surgical history and problem list.     Past Medical History:   Diagnosis Date    DVT (deep venous thrombosis) (HCC)     Hyperlipidemia     Hypertension        History reviewed. No pertinent surgical history.    Family History    Problem Relation Age of Onset    Colon cancer Father     Heart disease Maternal Grandmother          Medications have been verified.        Objective   /78   Pulse 75   Temp 98.3 °F (36.8 °C) (Tympanic)   Resp 16   Wt 91.2 kg (201 lb)   SpO2 98%   BMI 34.50 kg/m²        Physical Exam     Physical Exam  Constitutional:       General: She is not in acute distress.  HENT:      Head: Normocephalic.      Nose: Nose normal.   Eyes:      General: Lids are normal.         Right eye: No discharge.         Left eye: Discharge present.     Extraocular Movements: Extraocular movements intact.      Right eye: Normal extraocular motion and no nystagmus.      Left eye: Normal extraocular motion and no nystagmus.      Conjunctiva/sclera:      Right eye: Right conjunctiva is injected.      Left eye: Left conjunctiva is injected.      Pupils: Pupils are equal, round, and reactive to light.   Cardiovascular:      Rate and Rhythm: Normal rate and regular rhythm.      Pulses: Normal pulses.      Heart sounds: Normal heart sounds.   Pulmonary:      Effort: Pulmonary effort is normal.      Breath sounds: Normal breath sounds.   Abdominal:      General: Abdomen is flat.   Musculoskeletal:         General: Normal range of motion.   Skin:     General: Skin is warm and dry.      Capillary Refill: Capillary refill takes less than 2 seconds.   Neurological:      Mental Status: She is alert and oriented to person, place, and time.

## 2025-07-09 ENCOUNTER — OFFICE VISIT (OUTPATIENT)
Dept: URGENT CARE | Facility: CLINIC | Age: 80
End: 2025-07-09
Payer: MEDICARE

## 2025-07-09 VITALS
OXYGEN SATURATION: 100 % | BODY MASS INDEX: 34.84 KG/M2 | RESPIRATION RATE: 16 BRPM | WEIGHT: 203 LBS | HEART RATE: 65 BPM | DIASTOLIC BLOOD PRESSURE: 72 MMHG | SYSTOLIC BLOOD PRESSURE: 128 MMHG | TEMPERATURE: 96.4 F

## 2025-07-09 DIAGNOSIS — L03.116 CELLULITIS OF LEFT LOWER LEG: Primary | ICD-10-CM

## 2025-07-09 PROCEDURE — 99213 OFFICE O/P EST LOW 20 MIN: CPT | Performed by: PHYSICIAN ASSISTANT

## 2025-07-09 RX ORDER — CEPHALEXIN 500 MG/1
500 CAPSULE ORAL EVERY 8 HOURS SCHEDULED
Qty: 15 CAPSULE | Refills: 0 | Status: SHIPPED | OUTPATIENT
Start: 2025-07-09 | End: 2025-07-14

## 2025-07-09 NOTE — PROGRESS NOTES
[facility]  Name: Jagruti Rodriguez      : 1945      MRN: [id number]  Encounter Provider: Melida Weston PA-C  Encounter Date: 2025   Encounter department: [facility] [place]  :  Assessment & Plan  Cellulitis of left lower leg    Orders:    cephalexin (KEFLEX) 500 mg capsule; Take 1 capsule (500 mg total) by mouth every 8 (eight) hours for 5 days  Start Keflex given clinical picture and spreading of the erythema. Advised to seek emergency medical help if experiencing facial swelling, difficulty breathing, difficulty speaking/swallowing, or hives while taking this medication.  Additional education to been seen emergently if erythema continues to spread, fever develops, or there is severe pain in the extremity/difficulty walking.       Patient Instructions  Follow up with PCP in 3-5 days.  Proceed to  ER if symptoms worsen.    If tests are performed, our office will contact you with results only if changes need to made to the care plan discussed with you at the visit. You can review your full results on [facility].    Chief Complaint:   Chief Complaint   Patient presents with    Rash     Reports red, warm, and itchy rash x 3 days on left inner ankle. States she is unsure where it came from but wonders if she was bitten by something. Has been outside over the weekend. Initially applied cortisone 10.       History of Present Illness   Jagruti is an 81 y/o female with a PMH of HTN, HLD, osteoarthritis, GERD, and hx DVT who presents today for lower leg rash x 5 days. She states she thinks she was bitten by a bug and has since developed erythema surrounding the bite which has spread down towards her ankle. She states the rash is itchy, warm, and is painful to the touch. Also endorses headache along with the rash which subsides with tylenol. She has tried benadryl and hydrocortisone cream without relief. She reports a rash with PCNs, but denies hives/anaphylaxis.      Rash  Pertinent negatives include no cough, diarrhea, fever, shortness of breath or vomiting.         Review of Systems   Constitutional:  Negative for chills, diaphoresis and fever.   HENT:  Negative for facial swelling and trouble swallowing.    Eyes:  Negative for photophobia and visual disturbance.   Respiratory:  Negative for cough, chest tightness, shortness of breath and wheezing.    Cardiovascular:  Negative for chest pain and leg swelling.   Gastrointestinal:  Negative for diarrhea, nausea and vomiting.   Endocrine: Negative.    Genitourinary: Negative.    Musculoskeletal:  Negative for arthralgias and myalgias.   Skin:  Positive for rash.   Neurological:  Positive for headaches. Negative for dizziness, syncope and light-headedness.   Hematological:  Negative for adenopathy.   Psychiatric/Behavioral: Negative.       Past Medical History   Past Medical History[1]  Past Surgical History[2]  Family History[3]  she reports that she has never smoked. She has never used smokeless tobacco. She reports current alcohol use of about 1.0 standard drink of alcohol per week. She reports that she does not use drugs.  Current Outpatient Medications   Medication Instructions    apixaban (ELIQUIS) 5 mg, Oral, 2 times daily    Calcium Carbonate-Vit D-Min (CALCIUM 1200 PO) Take by mouth    cephalexin (KEFLEX) 500 mg, Oral, Every 8 hours scheduled    co-enzyme Q-10 30 mg, 3 times daily    Co-Enzyme Q10 100 mg, Daily    lisinopril-hydrochlorothiazide (PRINZIDE,ZESTORETIC) 20-12.5 MG per tablet Take by mouth    metoprolol succinate (TOPROL-XL) 50 mg, Daily    multivitamin (THERAGRAN) TABS 1 tablet, Daily    NON FORMULARY Cortisone injection back    ofloxacin (OCUFLOX) 0.3 % ophthalmic solution 1 drop, Both Eyes, 4 times daily    simvastatin (ZOCOR) 20 mg, Every evening    triamcinolone acetonide (KENALOG-40) 40 mg/mL Inject into a muscle    Vitamin D3 2,000 Units, Daily   Allergies[4]     Objective   /72   Pulse 65    "Temp (!) 96.4 °F (35.8 °C) (Tympanic)   Resp 16   Wt 92.1 kg (203 lb)   SpO2 100%   BMI 34.84 kg/m²      Physical Exam  Vitals and nursing note reviewed.   Constitutional:       General: She is not in acute distress.     Appearance: Normal appearance. She is normal weight.   HENT:      Head: Normocephalic and atraumatic.      Mouth/Throat:      Mouth: Mucous membranes are moist.      Pharynx: Oropharynx is clear.     Eyes:      Extraocular Movements: Extraocular movements intact.      Conjunctiva/sclera: Conjunctivae normal.       Cardiovascular:      Rate and Rhythm: Normal rate and regular rhythm.      Pulses: Normal pulses.      Heart sounds: Normal heart sounds.   Pulmonary:      Effort: Pulmonary effort is normal. No respiratory distress.      Breath sounds: Normal breath sounds. No wheezing.   Abdominal:      General: Abdomen is flat.      Palpations: Abdomen is soft.     Musculoskeletal:         General: No swelling. Normal range of motion.      Cervical back: Normal range of motion and neck supple.   Lymphadenopathy:      Cervical: No cervical adenopathy.     Skin:     General: Skin is warm and dry.      Findings: Erythema (Erythema of the lower left leg, blanchable to palpation, warm and firm. Extends beyond previously marked borders) present.     Neurological:      Mental Status: She is alert.         Portions of the record may have been created with voice recognition software.  Occasional wrong word or \"sound a like\" substitutions may have occurred due to the inherent limitations of voice recognition software.  Read the chart carefully and recognize, using context, where substitutions have occurred.         [1]   Past Medical History:  Diagnosis Date    DVT (deep venous thrombosis) (HCC)     Hyperlipidemia     Hypertension    [2] No past surgical history on file.  [3]   Family History  Problem Relation Name Age of Onset    Colon cancer Father      Heart disease Maternal Grandmother     [4] "   Allergies  Allergen Reactions    Pollen Extract Allergic Rhinitis    Penicillins Hives and Rash